# Patient Record
Sex: FEMALE | Race: BLACK OR AFRICAN AMERICAN | NOT HISPANIC OR LATINO
[De-identification: names, ages, dates, MRNs, and addresses within clinical notes are randomized per-mention and may not be internally consistent; named-entity substitution may affect disease eponyms.]

---

## 2017-08-25 ENCOUNTER — RESULT REVIEW (OUTPATIENT)
Age: 27
End: 2017-08-25

## 2018-03-20 ENCOUNTER — RESULT REVIEW (OUTPATIENT)
Age: 28
End: 2018-03-20

## 2018-10-23 ENCOUNTER — RESULT REVIEW (OUTPATIENT)
Age: 28
End: 2018-10-23

## 2018-12-06 ENCOUNTER — RESULT REVIEW (OUTPATIENT)
Age: 28
End: 2018-12-06

## 2019-05-02 ENCOUNTER — RESULT REVIEW (OUTPATIENT)
Age: 29
End: 2019-05-02

## 2019-08-23 ENCOUNTER — APPOINTMENT (OUTPATIENT)
Dept: GYNECOLOGIC ONCOLOGY | Facility: CLINIC | Age: 29
End: 2019-08-23
Payer: COMMERCIAL

## 2019-08-23 ENCOUNTER — RECORD ABSTRACTING (OUTPATIENT)
Age: 29
End: 2019-08-23

## 2019-08-23 VITALS
BODY MASS INDEX: 23.74 KG/M2 | HEART RATE: 74 BPM | HEIGHT: 62 IN | SYSTOLIC BLOOD PRESSURE: 100 MMHG | WEIGHT: 129 LBS | DIASTOLIC BLOOD PRESSURE: 72 MMHG

## 2019-08-23 DIAGNOSIS — Z78.9 OTHER SPECIFIED HEALTH STATUS: ICD-10-CM

## 2019-08-23 DIAGNOSIS — Z80.3 FAMILY HISTORY OF MALIGNANT NEOPLASM OF BREAST: ICD-10-CM

## 2019-08-23 DIAGNOSIS — Z34.90 ENCOUNTER FOR SUPERVISION OF NORMAL PREGNANCY, UNSPECIFIED, UNSPECIFIED TRIMESTER: ICD-10-CM

## 2019-08-23 PROCEDURE — 57452 EXAM OF CERVIX W/SCOPE: CPT

## 2019-08-23 PROCEDURE — 99203 OFFICE O/P NEW LOW 30 MIN: CPT | Mod: 25

## 2019-09-03 ENCOUNTER — APPOINTMENT (OUTPATIENT)
Dept: ANTEPARTUM | Facility: CLINIC | Age: 29
End: 2019-09-03
Payer: COMMERCIAL

## 2019-09-03 ENCOUNTER — ASOB RESULT (OUTPATIENT)
Age: 29
End: 2019-09-03

## 2019-09-03 PROCEDURE — 76801 OB US < 14 WKS SINGLE FETUS: CPT

## 2019-09-03 PROCEDURE — 36416 COLLJ CAPILLARY BLOOD SPEC: CPT

## 2019-09-03 PROCEDURE — 76813 OB US NUCHAL MEAS 1 GEST: CPT

## 2019-10-29 ENCOUNTER — APPOINTMENT (OUTPATIENT)
Dept: ANTEPARTUM | Facility: CLINIC | Age: 29
End: 2019-10-29
Payer: COMMERCIAL

## 2019-10-29 ENCOUNTER — ASOB RESULT (OUTPATIENT)
Age: 29
End: 2019-10-29

## 2019-10-29 PROCEDURE — 76811 OB US DETAILED SNGL FETUS: CPT

## 2019-12-17 ENCOUNTER — INPATIENT (INPATIENT)
Facility: HOSPITAL | Age: 29
LOS: 1 days | Discharge: HOME | End: 2019-12-19
Attending: SPECIALIST | Admitting: SPECIALIST
Payer: COMMERCIAL

## 2019-12-17 VITALS
HEART RATE: 102 BPM | RESPIRATION RATE: 17 BRPM | HEIGHT: 62 IN | SYSTOLIC BLOOD PRESSURE: 121 MMHG | TEMPERATURE: 98 F | WEIGHT: 151.9 LBS | DIASTOLIC BLOOD PRESSURE: 69 MMHG

## 2019-12-17 DIAGNOSIS — Z98.890 OTHER SPECIFIED POSTPROCEDURAL STATES: Chronic | ICD-10-CM

## 2019-12-17 DIAGNOSIS — O47.9 FALSE LABOR, UNSPECIFIED: ICD-10-CM

## 2019-12-17 DIAGNOSIS — O26.899 OTHER SPECIFIED PREGNANCY RELATED CONDITIONS, UNSPECIFIED TRIMESTER: ICD-10-CM

## 2019-12-17 DIAGNOSIS — Z3A.00 WEEKS OF GESTATION OF PREGNANCY NOT SPECIFIED: ICD-10-CM

## 2019-12-17 LAB
ALBUMIN SERPL ELPH-MCNC: 3.4 G/DL — SIGNIFICANT CHANGE UP (ref 3.3–5)
ALP SERPL-CCNC: 62 U/L — SIGNIFICANT CHANGE UP (ref 40–120)
ALT FLD-CCNC: 36 U/L — HIGH (ref 4–33)
ANION GAP SERPL CALC-SCNC: 13 MMO/L — SIGNIFICANT CHANGE UP (ref 7–14)
APPEARANCE UR: CLEAR — SIGNIFICANT CHANGE UP
APTT BLD: 25.6 SEC — LOW (ref 27.5–36.3)
AST SERPL-CCNC: 29 U/L — SIGNIFICANT CHANGE UP (ref 4–32)
BACTERIA # UR AUTO: HIGH
BASOPHILS # BLD AUTO: 0.1 K/UL — SIGNIFICANT CHANGE UP (ref 0–0.2)
BASOPHILS NFR BLD AUTO: 0.7 % — SIGNIFICANT CHANGE UP (ref 0–2)
BILIRUB SERPL-MCNC: < 0.2 MG/DL — LOW (ref 0.2–1.2)
BILIRUB UR-MCNC: NEGATIVE — SIGNIFICANT CHANGE UP
BLOOD UR QL VISUAL: NEGATIVE — SIGNIFICANT CHANGE UP
BUN SERPL-MCNC: 12 MG/DL — SIGNIFICANT CHANGE UP (ref 7–23)
CALCIUM SERPL-MCNC: 9 MG/DL — SIGNIFICANT CHANGE UP (ref 8.4–10.5)
CHLORIDE SERPL-SCNC: 103 MMOL/L — SIGNIFICANT CHANGE UP (ref 98–107)
CO2 SERPL-SCNC: 21 MMOL/L — LOW (ref 22–31)
COLOR SPEC: SIGNIFICANT CHANGE UP
CREAT SERPL-MCNC: 0.51 MG/DL — SIGNIFICANT CHANGE UP (ref 0.5–1.3)
EOSINOPHIL # BLD AUTO: 0.16 K/UL — SIGNIFICANT CHANGE UP (ref 0–0.5)
EOSINOPHIL NFR BLD AUTO: 1.1 % — SIGNIFICANT CHANGE UP (ref 0–6)
FIBRINOGEN PPP-MCNC: 520 MG/DL — HIGH (ref 350–510)
FLECAINIDE SERPL-MCNC: NEGATIVE — SIGNIFICANT CHANGE UP
GLUCOSE BLDC GLUCOMTR-MCNC: 71 MG/DL — SIGNIFICANT CHANGE UP (ref 70–99)
GLUCOSE SERPL-MCNC: 89 MG/DL — SIGNIFICANT CHANGE UP (ref 70–99)
GLUCOSE UR-MCNC: 300 — HIGH
HCT VFR BLD CALC: 36.9 % — SIGNIFICANT CHANGE UP (ref 34.5–45)
HGB BLD-MCNC: 12 G/DL — SIGNIFICANT CHANGE UP (ref 11.5–15.5)
HYALINE CASTS # UR AUTO: NEGATIVE — SIGNIFICANT CHANGE UP
IMM GRANULOCYTES NFR BLD AUTO: 1.1 % — SIGNIFICANT CHANGE UP (ref 0–1.5)
INR BLD: 0.93 — SIGNIFICANT CHANGE UP (ref 0.88–1.17)
KETONES UR-MCNC: NEGATIVE — SIGNIFICANT CHANGE UP
LDH SERPL L TO P-CCNC: 243 U/L — HIGH (ref 135–225)
LEUKOCYTE ESTERASE UR-ACNC: SIGNIFICANT CHANGE UP
LYMPHOCYTES # BLD AUTO: 17 % — SIGNIFICANT CHANGE UP (ref 13–44)
LYMPHOCYTES # BLD AUTO: 2.53 K/UL — SIGNIFICANT CHANGE UP (ref 1–3.3)
MCHC RBC-ENTMCNC: 27.8 PG — SIGNIFICANT CHANGE UP (ref 27–34)
MCHC RBC-ENTMCNC: 32.5 % — SIGNIFICANT CHANGE UP (ref 32–36)
MCV RBC AUTO: 85.6 FL — SIGNIFICANT CHANGE UP (ref 80–100)
MONOCYTES # BLD AUTO: 1.29 K/UL — HIGH (ref 0–0.9)
MONOCYTES NFR BLD AUTO: 8.7 % — SIGNIFICANT CHANGE UP (ref 2–14)
NEUTROPHILS # BLD AUTO: 10.62 K/UL — HIGH (ref 1.8–7.4)
NEUTROPHILS NFR BLD AUTO: 71.4 % — SIGNIFICANT CHANGE UP (ref 43–77)
NITRITE UR-MCNC: NEGATIVE — SIGNIFICANT CHANGE UP
NRBC # FLD: 0 K/UL — SIGNIFICANT CHANGE UP (ref 0–0)
PH UR: 6.5 — SIGNIFICANT CHANGE UP (ref 5–8)
PLATELET # BLD AUTO: 273 K/UL — SIGNIFICANT CHANGE UP (ref 150–400)
PMV BLD: 9.7 FL — SIGNIFICANT CHANGE UP (ref 7–13)
POTASSIUM SERPL-MCNC: 3.7 MMOL/L — SIGNIFICANT CHANGE UP (ref 3.5–5.3)
POTASSIUM SERPL-SCNC: 3.7 MMOL/L — SIGNIFICANT CHANGE UP (ref 3.5–5.3)
PROT SERPL-MCNC: 6.8 G/DL — SIGNIFICANT CHANGE UP (ref 6–8.3)
PROT UR-MCNC: NEGATIVE — SIGNIFICANT CHANGE UP
PROTHROM AB SERPL-ACNC: 10.6 SEC — SIGNIFICANT CHANGE UP (ref 9.8–13.1)
RBC # BLD: 4.31 M/UL — SIGNIFICANT CHANGE UP (ref 3.8–5.2)
RBC # FLD: 13.2 % — SIGNIFICANT CHANGE UP (ref 10.3–14.5)
RBC CASTS # UR COMP ASSIST: SIGNIFICANT CHANGE UP (ref 0–?)
SODIUM SERPL-SCNC: 137 MMOL/L — SIGNIFICANT CHANGE UP (ref 135–145)
SP GR SPEC: 1.01 — SIGNIFICANT CHANGE UP (ref 1–1.04)
SQUAMOUS # UR AUTO: SIGNIFICANT CHANGE UP
URATE SERPL-MCNC: 2.5 MG/DL — SIGNIFICANT CHANGE UP (ref 2.5–7)
UROBILINOGEN FLD QL: NORMAL — SIGNIFICANT CHANGE UP
WBC # BLD: 14.86 K/UL — HIGH (ref 3.8–10.5)
WBC # FLD AUTO: 14.86 K/UL — HIGH (ref 3.8–10.5)
WBC UR QL: HIGH (ref 0–?)

## 2019-12-17 PROCEDURE — 76700 US EXAM ABDOM COMPLETE: CPT | Mod: 26

## 2019-12-17 RX ORDER — AMPICILLIN TRIHYDRATE 250 MG
2 CAPSULE ORAL ONCE
Refills: 0 | Status: COMPLETED | OUTPATIENT
Start: 2019-12-17 | End: 2019-12-17

## 2019-12-17 RX ORDER — MAGNESIUM SULFATE 500 MG/ML
4 VIAL (ML) INJECTION ONCE
Refills: 0 | Status: COMPLETED | OUTPATIENT
Start: 2019-12-17 | End: 2019-12-18

## 2019-12-17 RX ORDER — MAGNESIUM SULFATE 500 MG/ML
2 VIAL (ML) INJECTION
Qty: 40 | Refills: 0 | Status: DISCONTINUED | OUTPATIENT
Start: 2019-12-17 | End: 2019-12-18

## 2019-12-17 RX ORDER — SODIUM CHLORIDE 9 MG/ML
1000 INJECTION, SOLUTION INTRAVENOUS
Refills: 0 | Status: DISCONTINUED | OUTPATIENT
Start: 2019-12-17 | End: 2019-12-18

## 2019-12-17 RX ORDER — SODIUM CHLORIDE 9 MG/ML
1000 INJECTION, SOLUTION INTRAVENOUS ONCE
Refills: 0 | Status: COMPLETED | OUTPATIENT
Start: 2019-12-17 | End: 2019-12-17

## 2019-12-17 RX ORDER — AMPICILLIN TRIHYDRATE 250 MG
1 CAPSULE ORAL EVERY 4 HOURS
Refills: 0 | Status: DISCONTINUED | OUTPATIENT
Start: 2019-12-17 | End: 2019-12-18

## 2019-12-17 RX ADMIN — SODIUM CHLORIDE 1000 MILLILITER(S): 9 INJECTION, SOLUTION INTRAVENOUS at 23:15

## 2019-12-17 RX ADMIN — Medication 12 MILLIGRAM(S): at 21:40

## 2019-12-17 RX ADMIN — SODIUM CHLORIDE 1000 MILLILITER(S): 9 INJECTION, SOLUTION INTRAVENOUS at 21:20

## 2019-12-17 NOTE — OB PROVIDER TRIAGE NOTE - NSHPLABSRESULTS_GEN_ALL_CORE
Vital Signs Last 24 Hrs  T(C): 36.8 (17 Dec 2019 19:10), Max: 36.8 (17 Dec 2019 19:10)  T(F): 98.2 (17 Dec 2019 19:10), Max: 98.2 (17 Dec 2019 19:10)  HR: 93 (17 Dec 2019 21:30) (80 - 102)  BP: 100/58 (17 Dec 2019 20:36) (100/58 - 121/69)  BP(mean): --  RR: 17 (17 Dec 2019 19:10) (17 - 17)  SpO2: 100% (17 Dec 2019 21:30) (78% - 100%) Vital Signs Last 24 Hrs  T(C): 36.8 (17 Dec 2019 19:10), Max: 36.8 (17 Dec 2019 19:10)  T(F): 98.2 (17 Dec 2019 19:10), Max: 98.2 (17 Dec 2019 19:10)  HR: 93 (17 Dec 2019 21:30) (80 - 102)  BP: 100/58 (17 Dec 2019 20:36) (100/58 - 121/69)  BP(mean): --  RR: 17 (17 Dec 2019 19:10) (17 - 17)  SpO2: 100% (17 Dec 2019 21:30) (78% - 100%)                          12.0   14.86 )-----------( 273      ( 17 Dec 2019 21:25 )             36.9     12    137  |  103  |  12  ----------------------------<  89  3.7   |  21<L>  |  0.51    Ca    9.0      17 Dec 2019 21:25    TPro  6.8  /  Alb  3.4  /  TBili  < 0.2<L>  /  DBili  x   /  AST  29  /  ALT  36<H>  /  AlkPhos  62  12-17    PT/INR - ( 17 Dec 2019 22:10 )   PT: 10.6 SEC;   INR: 0.93          PTT - ( 17 Dec 2019 22:10 )  PTT:25.6 SEC    Fibrinogen: 520    Urinalysis Basic - ( 17 Dec 2019 20:30 )    Color: LIGHT YELLOW / Appearance: CLEAR / S.011 / pH: 6.5  Gluc: 300 / Ketone: NEGATIVE  / Bili: NEGATIVE / Urobili: NORMAL   Blood: NEGATIVE / Protein: NEGATIVE / Nitrite: NEGATIVE   Leuk Esterase: SMALL / RBC: 0-2 / WBC 6-10   Sq Epi: FEW / Non Sq Epi: x / Bacteria: MODERATE

## 2019-12-17 NOTE — OB PROVIDER TRIAGE NOTE - HISTORY OF PRESENT ILLNESS
Patient of Dr Sandoval  30 y/o AA female, para 0000 @ 27+1 wks gestation, SIUP.  Presents with intermittent abdominal cramping, pelvic pressure, and back pain since 6am. Pt denies any urinary symptoms, denies any fever, chills.  pt endorses strong fetal movement, no leakage of fluid, no vaginal bleeding. Pt reports uncomplicated A/P course thus far.    Allergies: NKDA  Meds: Prenatal vitamins  OBgynHx    2018-Abn. PAP, s/p benign colposcopy  Pt denies any h/o: ovarian cysts, fibroids, breast problems, STDs/STIs  PMH: denies  PSH: Colposcopy with cervical biopsy ('18)  PSY: Denies  EtOH/smoke/recreational drugs: denies  FH: Breast CA (Mother Dx @ 49 y/o)  H/W/BMI: 62"/152#/27.8

## 2019-12-17 NOTE — OB RN TRIAGE NOTE - PMH
Abnormal cervical Papanicolaou smear, unspecified abnormal pap finding  Diagnosed at 8 weeks pregnancy, for PP follow up

## 2019-12-17 NOTE — OB PROVIDER H&P - HISTORY OF PRESENT ILLNESS
Patient of Dr Sandoval  28 y/o AA female, para 0000 @ 27+1 wks gestation, SIUP.  Presents with intermittent abdominal cramping, pelvic pressure, and back pain since 6am. Pt denies any urinary symptoms, denies any fever, chills.  pt endorses strong fetal movement, no leakage of fluid, no vaginal bleeding. Pt reports uncomplicated A/P course thus far.    Allergies: NKDA  Meds: Prenatal vitamins  OBgynHx    2018-Abn. PAP, s/p benign colposcopy  Pt denies any h/o: ovarian cysts, fibroids, breast problems, STDs/STIs  PMH: Chronic elevated liver enzymes  PSH: Colposcopy with cervical biopsy ('18)  PSY: Denies  EtOH/smoke/recreational drugs: denies  FH: Breast CA (Mother Dx @ 49 y/o)  H/W/BMI: 62"/152#/27.8

## 2019-12-17 NOTE — OB PROVIDER TRIAGE NOTE - NS_OBGYNHISTORY_OBGYN_ALL_OB_FT
2018-Abn. PAP, s/p benign colposcopy  Pt denies any h/o: ovarian cysts, fibroids, breast problems, STDs/STIs

## 2019-12-17 NOTE — OB PROVIDER H&P - NSHPPHYSICALEXAM_GEN_ALL_CORE
28 y/o AA female, para 0000 @ 27+1 wks gestation, SIUP.  PT contractions with Incidental finding of short cervix  Gen: NAD; A&O x3  Abd. gravid, soft in b/t ctx, non-tender, mild ctx palpated  Negative CVA tenderness  Negative suprapubic tenderness  VS--BP: 121/69, P102, RR 18, T 36.8, pain 3/10  FS: 71  Cat 1 tracin bpm, moderate variability, +accels, no decels  [place]: q 2 min.  SSE: +Leukorrhea, no blood, no pooling, cervix appears opened  SVE: /-3, intact membranes  GBS obtained and sent  TVUS: Cervical length 1.39-1.59 cm, marked funneling, no previa, no dynamic changes-FFN negative  Limited TAS: SLIUP, Cephalic, posterior placenta, BPP 8/8, ALEXA 25.14 cm, EFW [id number]  Pt states GCT not done yet, scheduled for

## 2019-12-17 NOTE — OB PROVIDER TRIAGE NOTE - NSHPPHYSICALEXAM_GEN_ALL_CORE
28 y/o AA female, para 0000 @ 27+1 wks gestation, SIUP.  Gen: NAD; A&O x3  Abd. gravid, soft in b/t ctx, non-tender, mild ctx palpated  VS--BP: 121/69, P102, RR 18, T 36.8, pain 3/10  Cat 1 tracin bpm, moderate variability, +accels, no decels  [place]: q 2 min.  SSE: +Leukorrhea, no blood, no pooling, cervix appears opened  SVE: /-3, intact membranes  GBS obtained and sent  TVUS: Cervical length 1.39-1.59 cm, marked funneling, no previa, no dynamic changes-FFN sent  Limited TAS: SLIUP, Cephalic, posterior placenta, BPP 8/8, ALEXA 25.14 cm, EFW AGA-1209g  Plan:  CBC/CMP/UA/C&S/urine GC/FFN/IVF/Betamethasone 28 y/o AA female, para 0000 @ 27+1 wks gestation, SIUP.  Gen: NAD; A&O x3  Abd. gravid, soft in b/t ctx, non-tender, mild ctx palpated  VS--BP: 121/69, P102, RR 18, T 36.8, pain 3/10  FS: 71  Cat 1 tracin bpm, moderate variability, +accels, no decels  [place]: q 2 min.  SSE: +Leukorrhea, no blood, no pooling, cervix appears opened  SVE: /-3, intact membranes  GBS obtained and sent  TVUS: Cervical length 1.39-1.59 cm, marked funneling, no previa, no dynamic changes-FFN sent  Limited TAS: SLIUP, Cephalic, posterior placenta, BPP 8/8, ALEXA 25.14 cm, EFW [id number]-1209g  Plan:  CBC/CMP/UA/C&S/urine GC/FFN/IVF/Betamethasone 28 y/o AA female, para 0000 @ 27+1 wks gestation, SIUP.  Gen: NAD; A&O x3  Abd. gravid, soft in b/t ctx, non-tender, mild ctx palpated  VS--BP: 121/69, P102, RR 18, T 36.8, pain 3/10  FS: 71  Cat 1 tracin bpm, moderate variability, +accels, no decels  [place]: q 2 min.  SSE: +Leukorrhea, no blood, no pooling, cervix appears opened  SVE: /-3, intact membranes  GBS obtained and sent  TVUS: Cervical length 1.39-1.59 cm, marked funneling, no previa, no dynamic changes-FFN sent  Limited TAS: SLIUP, Cephalic, posterior placenta, BPP 8/8, ALEXA 25.14 cm, EFW AGA-1209g  Pt states GCT not done yet, scheduled for   Plan:  CBC/CMP/UA/C&S/urine GC/FFN/IVF/Betamethasone 30 y/o AA female, para 0000 @ 27+1 wks gestation, SIUP.  Gen: NAD; A&O x3  Abd. gravid, soft in b/t ctx, non-tender, mild ctx palpated  Negative CVA tenderness  Negative suprapubic tenderness  VS--BP: 121/69, P102, RR 18, T 36.8, pain 3/10  FS: 71  Cat 1 tracin bpm, moderate variability, +accels, no decels  [place]: q 2 min.  SSE: +Leukorrhea, no blood, no pooling, cervix appears opened  SVE: 50/-3, intact membranes  GBS obtained and sent  TVUS: Cervical length 1.39-1.59 cm, marked funneling, no previa, no dynamic changes-FFN sent  Limited TAS: SLIUP, Cephalic, posterior placenta, BPP 8/8, ALEXA 25.14 cm, EFW [id number]-1209g  Pt states GCT not done yet, scheduled for   Plan:  CBC/CMP/UA/C&S/urine GC/FFN/IVF/Betamethasone

## 2019-12-17 NOTE — OB PROVIDER H&P - NSHPLABSRESULTS_GEN_ALL_CORE
Vital Signs Last 24 Hrs  T(C): 36.8 (17 Dec 2019 19:10), Max: 36.8 (17 Dec 2019 19:10)  T(F): 98.2 (17 Dec 2019 19:10), Max: 98.2 (17 Dec 2019 19:10)  HR: 93 (17 Dec 2019 21:30) (80 - 102)  BP: 100/58 (17 Dec 2019 20:36) (100/58 - 121/69)  BP(mean): --  RR: 17 (17 Dec 2019 19:10) (17 - 17)  SpO2: 100% (17 Dec 2019 21:30) (78% - 100%)                          12.0   14.86 )-----------( 273      ( 17 Dec 2019 21:25 )             36.9     12    137  |  103  |  12  ----------------------------<  89  3.7   |  21<L>  |  0.51    Ca    9.0      17 Dec 2019 21:25    TPro  6.8  /  Alb  3.4  /  TBili  < 0.2<L>  /  DBili  x   /  AST  29  /  ALT  36<H>  /  AlkPhos  62  12-17    PT/INR - ( 17 Dec 2019 22:10 )   PT: 10.6 SEC;   INR: 0.93          PTT - ( 17 Dec 2019 22:10 )  PTT:25.6 SEC    Fibrinogen: 520    Urinalysis Basic - ( 17 Dec 2019 20:30 )    Color: LIGHT YELLOW / Appearance: CLEAR / S.011 / pH: 6.5  Gluc: 300 / Ketone: NEGATIVE  / Bili: NEGATIVE / Urobili: NORMAL   Blood: NEGATIVE / Protein: NEGATIVE / Nitrite: NEGATIVE   Leuk Esterase: SMALL / RBC: 0-2 / WBC 6-10   Sq Epi: FEW / Non Sq Epi: x / Bacteria: MODERATE

## 2019-12-17 NOTE — OB PROVIDER H&P - ASSESSMENT
HPI:  Patient of Dr Sandoval  30 y/o AA female, para 0000 @ 27+1 wks gestation, SIUP.  Presents with intermittent abdominal cramping, pelvic pressure, and back pain since 6am. Pt denies any urinary symptoms, denies any fever, chills.  pt endorses strong fetal movement, no leakage of fluid, no vaginal bleeding. Pt reports uncomplicated A/P course thus far.  Assessment:  30 y/o AA female, para 0000 @ 27+1 wks gestation, SIUP.  PT contractions with Incidental finding of short cervix  Gen: NAD; A&O x3  Abd. gravid, soft in b/t ctx, non-tender, mild ctx palpated  Negative CVA tenderness  Negative suprapubic tenderness  VS--BP: 121/69, P102, RR 18, T 36.8, pain 3/10  FS: 71  Cat 1 tracin bpm, moderate variability, +accels, no decels  [place]: q 2 min.  SSE: +Leukorrhea, no blood, no pooling, cervix appears opened  SVE: 50/-3, intact membranes  TVUS: Cervical length 1.39-1.59 cm, marked funneling, no previa, no dynamic changes - FFN negative  Limited TAS: SLIUP, Cephalic, posterior placenta, BPP 8/8, ALEXA 25.14 cm, EFW AGA-1209g  Pt states GCT not done yet, scheduled for   ALT elevated-Pt reports prior history of elevated liver enzymes and per PMD "not concerned".  GBS obtained and sent  Urine culture sent  Urine GC sent  First dose of Betamethasone @   Plan of care d/w Dr Sam    Plan:  Admit to L&D  -For Magnesium sulfate/Ampicillin/Betamethasone  -2nd dose of Betamethasone due  @   -Re-assess pain management options PRN  -Abdominal U/S report-pending  -PEC labs-Pending  Report off to Dr Pineda

## 2019-12-17 NOTE — OB PROVIDER TRIAGE NOTE - FAMILY HISTORY
Mother  Still living? Unknown  Family history of breast cancer in first degree relative, Age at diagnosis: Age Unknown

## 2019-12-17 NOTE — OB PROVIDER H&P - PROBLEM SELECTOR PLAN 1
Admit to L&D  -For Magnesium sulfate/Ampicillin/Betamethasone  -2nd dose of Betamethasone due 12/18 @ 0590  -Re-assess pain management options PRN  -Abdominal U/S report-pending  -PEC labs-Pending

## 2019-12-17 NOTE — OB PROVIDER TRIAGE NOTE - LABOR: FETAL STATION
-3
Elbow fracture, right    Lumbar disc herniation    Retinal detachment  left eye- 05/08/2014  Triceps tendinitis  right

## 2019-12-17 NOTE — OB PROVIDER H&P - PMH
Abnormal cervical Papanicolaou smear, unspecified abnormal pap finding  Diagnosed at 8 weeks pregnancy, for PP follow up  Elevated liver enzymes  Chronic

## 2019-12-17 NOTE — OB PROVIDER TRIAGE NOTE - NSOBPROVIDERNOTE_OBGYN_ALL_OB_FT
ALT elevated-Pt reports prior history of elevated liver enzymes and per PMD "not concerned". ALT elevated-Pt reports prior history of elevated liver enzymes and per PMD "not concerned". Abdominal ultrasound ordered and performed-pending results.   PEC labs not fully resulted.   Dr Sam notified of above findings  -Admit to L&D per Dr Sam  See H&P

## 2019-12-17 NOTE — OB PROVIDER H&P - NS_PHYSICALALLNEG_OBGYN_ALL_OB
Pt EKG attached in chart. Pt states no NSAIDs, ASAs, or blood thinners taken in the past week. All other review of systems negative, except as noted in HPI

## 2019-12-18 ENCOUNTER — ASOB RESULT (OUTPATIENT)
Age: 29
End: 2019-12-18

## 2019-12-18 ENCOUNTER — OUTPATIENT (OUTPATIENT)
Dept: OUTPATIENT SERVICES | Facility: HOSPITAL | Age: 29
LOS: 1 days | End: 2019-12-18

## 2019-12-18 ENCOUNTER — APPOINTMENT (OUTPATIENT)
Dept: ANTEPARTUM | Facility: HOSPITAL | Age: 29
End: 2019-12-18
Payer: COMMERCIAL

## 2019-12-18 DIAGNOSIS — O60.00 PRETERM LABOR WITHOUT DELIVERY, UNSPECIFIED TRIMESTER: ICD-10-CM

## 2019-12-18 DIAGNOSIS — Z98.890 OTHER SPECIFIED POSTPROCEDURAL STATES: Chronic | ICD-10-CM

## 2019-12-18 LAB
ALBUMIN SERPL ELPH-MCNC: 3.5 G/DL — SIGNIFICANT CHANGE UP (ref 3.3–5)
ALP SERPL-CCNC: 65 U/L — SIGNIFICANT CHANGE UP (ref 40–120)
ALT FLD-CCNC: 35 U/L — HIGH (ref 4–33)
ANION GAP SERPL CALC-SCNC: 13 MMO/L — SIGNIFICANT CHANGE UP (ref 7–14)
APTT BLD: 24.9 SEC — LOW (ref 27.5–36.3)
AST SERPL-CCNC: 31 U/L — SIGNIFICANT CHANGE UP (ref 4–32)
BASOPHILS # BLD AUTO: 0.07 K/UL — SIGNIFICANT CHANGE UP (ref 0–0.2)
BASOPHILS NFR BLD AUTO: 0.4 % — SIGNIFICANT CHANGE UP (ref 0–2)
BILIRUB SERPL-MCNC: < 0.2 MG/DL — LOW (ref 0.2–1.2)
BLD GP AB SCN SERPL QL: NEGATIVE — SIGNIFICANT CHANGE UP
BUN SERPL-MCNC: 7 MG/DL — SIGNIFICANT CHANGE UP (ref 7–23)
CALCIUM SERPL-MCNC: 7.9 MG/DL — LOW (ref 8.4–10.5)
CHLORIDE SERPL-SCNC: 102 MMOL/L — SIGNIFICANT CHANGE UP (ref 98–107)
CO2 SERPL-SCNC: 20 MMOL/L — LOW (ref 22–31)
CREAT ?TM UR-MCNC: 20.4 MG/DL — SIGNIFICANT CHANGE UP
CREAT SERPL-MCNC: 0.51 MG/DL — SIGNIFICANT CHANGE UP (ref 0.5–1.3)
EOSINOPHIL # BLD AUTO: 0 K/UL — SIGNIFICANT CHANGE UP (ref 0–0.5)
EOSINOPHIL NFR BLD AUTO: 0 % — SIGNIFICANT CHANGE UP (ref 0–6)
FIBRINOGEN PPP-MCNC: 551 MG/DL — HIGH (ref 350–510)
GLUCOSE SERPL-MCNC: 137 MG/DL — HIGH (ref 70–99)
HCT VFR BLD CALC: 37.4 % — SIGNIFICANT CHANGE UP (ref 34.5–45)
HGB BLD-MCNC: 12 G/DL — SIGNIFICANT CHANGE UP (ref 11.5–15.5)
IMM GRANULOCYTES NFR BLD AUTO: 1.4 % — SIGNIFICANT CHANGE UP (ref 0–1.5)
INR BLD: 0.94 — SIGNIFICANT CHANGE UP (ref 0.88–1.17)
LDH SERPL L TO P-CCNC: 234 U/L — HIGH (ref 135–225)
LYMPHOCYTES # BLD AUTO: 1.21 K/UL — SIGNIFICANT CHANGE UP (ref 1–3.3)
LYMPHOCYTES # BLD AUTO: 6.9 % — LOW (ref 13–44)
MAGNESIUM SERPL-MCNC: 4.6 MG/DL — HIGH (ref 1.6–2.6)
MAGNESIUM SERPL-MCNC: 5.2 MG/DL — HIGH (ref 1.6–2.6)
MCHC RBC-ENTMCNC: 27.1 PG — SIGNIFICANT CHANGE UP (ref 27–34)
MCHC RBC-ENTMCNC: 32.1 % — SIGNIFICANT CHANGE UP (ref 32–36)
MCV RBC AUTO: 84.4 FL — SIGNIFICANT CHANGE UP (ref 80–100)
MONOCYTES # BLD AUTO: 0.12 K/UL — SIGNIFICANT CHANGE UP (ref 0–0.9)
MONOCYTES NFR BLD AUTO: 0.7 % — LOW (ref 2–14)
NEUTROPHILS # BLD AUTO: 15.99 K/UL — HIGH (ref 1.8–7.4)
NEUTROPHILS NFR BLD AUTO: 90.6 % — HIGH (ref 43–77)
NRBC # FLD: 0 K/UL — SIGNIFICANT CHANGE UP (ref 0–0)
PLATELET # BLD AUTO: 285 K/UL — SIGNIFICANT CHANGE UP (ref 150–400)
PMV BLD: 9.8 FL — SIGNIFICANT CHANGE UP (ref 7–13)
POTASSIUM SERPL-MCNC: 4 MMOL/L — SIGNIFICANT CHANGE UP (ref 3.5–5.3)
POTASSIUM SERPL-SCNC: 4 MMOL/L — SIGNIFICANT CHANGE UP (ref 3.5–5.3)
PROT SERPL-MCNC: 7 G/DL — SIGNIFICANT CHANGE UP (ref 6–8.3)
PROT UR-MCNC: 4.4 MG/DL — SIGNIFICANT CHANGE UP
PROTHROM AB SERPL-ACNC: 10.4 SEC — SIGNIFICANT CHANGE UP (ref 9.8–13.1)
RBC # BLD: 4.43 M/UL — SIGNIFICANT CHANGE UP (ref 3.8–5.2)
RBC # FLD: 13.2 % — SIGNIFICANT CHANGE UP (ref 10.3–14.5)
RH IG SCN BLD-IMP: POSITIVE — SIGNIFICANT CHANGE UP
RH IG SCN BLD-IMP: POSITIVE — SIGNIFICANT CHANGE UP
SODIUM SERPL-SCNC: 135 MMOL/L — SIGNIFICANT CHANGE UP (ref 135–145)
T PALLIDUM AB TITR SER: NEGATIVE — SIGNIFICANT CHANGE UP
URATE SERPL-MCNC: 2.5 MG/DL — SIGNIFICANT CHANGE UP (ref 2.5–7)
WBC # BLD: 17.64 K/UL — HIGH (ref 3.8–10.5)
WBC # FLD AUTO: 17.64 K/UL — HIGH (ref 3.8–10.5)

## 2019-12-18 PROCEDURE — 76819 FETAL BIOPHYS PROFIL W/O NST: CPT | Mod: 26

## 2019-12-18 PROCEDURE — 76816 OB US FOLLOW-UP PER FETUS: CPT | Mod: 26

## 2019-12-18 RX ORDER — INDOMETHACIN 50 MG
25 CAPSULE ORAL EVERY 6 HOURS
Refills: 0 | Status: DISCONTINUED | OUTPATIENT
Start: 2019-12-18 | End: 2019-12-19

## 2019-12-18 RX ORDER — INDOMETHACIN 50 MG
50 CAPSULE ORAL ONCE
Refills: 0 | Status: COMPLETED | OUTPATIENT
Start: 2019-12-18 | End: 2019-12-18

## 2019-12-18 RX ORDER — INDOMETHACIN 50 MG
25 CAPSULE ORAL EVERY 6 HOURS
Refills: 0 | Status: DISCONTINUED | OUTPATIENT
Start: 2019-12-18 | End: 2019-12-18

## 2019-12-18 RX ADMIN — Medication 50 MILLIGRAM(S): at 05:30

## 2019-12-18 RX ADMIN — Medication 25 MILLIGRAM(S): at 17:12

## 2019-12-18 RX ADMIN — Medication 50 GM/HR: at 07:04

## 2019-12-18 RX ADMIN — Medication 25 MILLIGRAM(S): at 10:16

## 2019-12-18 RX ADMIN — Medication 200 GRAM(S): at 00:48

## 2019-12-18 RX ADMIN — Medication 216 GRAM(S): at 00:06

## 2019-12-18 RX ADMIN — Medication 50 MILLIGRAM(S): at 04:27

## 2019-12-18 RX ADMIN — Medication 108 GRAM(S): at 12:11

## 2019-12-18 RX ADMIN — Medication 25 MILLIGRAM(S): at 16:28

## 2019-12-18 RX ADMIN — Medication 108 GRAM(S): at 16:03

## 2019-12-18 RX ADMIN — Medication 12 MILLIGRAM(S): at 21:40

## 2019-12-18 RX ADMIN — Medication 50 GM/HR: at 01:10

## 2019-12-18 RX ADMIN — SODIUM CHLORIDE 50 MILLILITER(S): 9 INJECTION, SOLUTION INTRAVENOUS at 02:29

## 2019-12-18 RX ADMIN — Medication 25 MILLIGRAM(S): at 22:23

## 2019-12-18 RX ADMIN — Medication 108 GRAM(S): at 04:00

## 2019-12-18 RX ADMIN — Medication 25 MILLIGRAM(S): at 10:45

## 2019-12-18 RX ADMIN — Medication 108 GRAM(S): at 08:02

## 2019-12-18 NOTE — CHART NOTE - NSCHARTNOTEFT_GEN_A_CORE
Patient seen to evaluate for cervical change. Patient noted to be abhay every every 3 min on toco. Patient reports feeling contractions that "vary" from every 3min to every 10. Denies VB, LOF.       SVE: 3/80/-3, membranes intact  EFM: 140s, mod layne, + acels, no decels  [place]: q3 min    assessment: 30yo  @ 27w1d a/w r/o PTL, with regular contraction now making cervical change  plan:   - s/p BMX @ 940p  - continue Mag, amp  - start idomethacin 50mg once, then 25 q6h  - NICU called   - will continue monitoring on L&D  - repeat CBC/CMP with 7am mag level due to elevated LFTs    d/w Dr. Flaco Sosa PGY-4

## 2019-12-18 NOTE — CHART NOTE - NSCHARTNOTEFT_GEN_A_CORE
27.2 wks with PTL  pt is abhay less on Indocin and Magnesium 2gm   ampicillin and second dose of steroids tonight at 9:40 pm   pt was seen by MFM  and NICU   and continue current plan  Cat 1 tracing  VTX

## 2019-12-18 NOTE — CONSULT NOTE PEDS - SUBJECTIVE AND OBJECTIVE BOX
I met with Ms. Mcnulty on the antepartum unit and discussed what to expect should she were to deliver at 27 weeks gestation.    1. The NICU team is going to be present at the time of delivery, and the infant will be placed under the warmer and immediately evaluated.    2. Due to severe prematurity the infant will need respiratory support, either in the form of CPAP or intubation with mechanical ventilation. If the infant requires intubation, surfactant will be administered immediately at delivery or soon after. Due to prematurity, the infant may develop lung disease during the course of the NICU admission, referred to as bronchopulmonary dysplasia.    3. Initially, feedings will not be started in the infant due to the severe prematurity. The infant will require placement of an orogastric tube to provide enteral feedings, and feeding volume will be advanced slowly as tolerated. If the infant shows signs and symptoms of feeding intolerance, feedings may be held, and the infant may require evaluation for necrotizing enterocolitis.    4. The importance of human milk as the exclusive source of nutrition for  infants was discussed.    5. In order to have IV access lines would be placed in the umbilicus and later long term lines such as PICC lines would be needed to receive IV fluids/nutrition and medications.    6. Due to the severe prematurity, the infant will be started on antibiotics and screened for infections once born. The infant may require other courses of antibiotics during their hospital course if an infection is suspected.    7. The infant will be monitored for hypotension and may require vasopressor medication. The infant may also require screening for a patent ductus arteriosus, and if clinically significant, may require medical or surgical treatment.    8. The infant is at risk for jaundice and will likely require phototherapy.    9. The infant will develop anemia of prematurity, and will likely require blood transfusions.    10. The infant is at increased risk for intraventricular hemorrhage, which may result in severe developmental delays. Even in the absence of IVH the infant is at risk for developmental delays as a consequence of prematurity. The infant will be followed by a developmental pediatrician after discharge from the NICU to monitor for neurodevelopmental delays.    11. The infant is at risk for retinopathy of prematurity. Severe ROP can lead to diminished visual acuity or blindness. The infant will be examined regularly by a pediatric ophthalmologist, and if ROP is severe may require medication or eye surgery.    Ms. Mcnulty had the chance to ask any questions she may have had, and was encouraged to contact the NICU at that time if additional questions arise.    Thank you for the opportunity to participate in the care of this patient and please inform us of any changes in her status.    Erin Aguilar   NICU Fellow  [id number]

## 2019-12-18 NOTE — PROGRESS NOTE ADULT - SUBJECTIVE AND OBJECTIVE BOX
R3 Antepartum Note-HD#    Patient seen and examined at bedside, no acute overnight events. No acute complaints. Pt reports +FM, denies LOF, VB, ctx, CP, SOB, N/V, fevers, and chills. Denies HA, blurry vision, RUQ/epigastric pain, new onset swelling.     Vital Signs Last 24 Hours  T(C): 36.6 (12-18-19 @ 03:30), Max: 36.9 (12-17-19 @ 23:40)  HR: 73 (12-18-19 @ 06:02) (69 - 102)  BP: 105/58 (12-18-19 @ 06:00) (87/52 - 121/69)  RR: 18 (12-17-19 @ 23:40) (17 - 18)  SpO2: 98% (12-18-19 @ 06:02) (78% - 100%)    CAPILLARY BLOOD GLUCOSE      POCT Blood Glucose.: 71 mg/dL (17 Dec 2019 21:34)      Physical Exam:  General: NAD  Abdomen: Soft, non-tender, gravid  Ext: No pain or swelling    Labs:             12.0   14.86 )-----------( 273      ( 12-17 @ 21:25 )             36.9     12-17 @ 21:25    137  |  103  |  12  ----------------------------<  89  3.7   |  21  |  0.51    Ca    9.0      12-17 @ 21:25    TPro  6.8  /  Alb  3.4  /  TBili  < 0.2  /  DBili  x   /  AST  29  /  ALT  36  /  AlkPhos  62  12-17 @ 21:25    PT/INR - ( 12-17 @ 22:10 )   PT: 10.6 SEC;   INR: 0.93     PTT - ( 12-17 @ 22:10 )  PTT:25.6 SEC    Uric Acid: (12-17 @ 22:10)  --       Fibrinogen: (12-17 @ 22:10)  520.0    LDH: (12-17 @ 22:10)  --         MEDICATIONS  (STANDING):  ampicillin  IVPB 1 Gram(s) IV Intermittent every 4 hours  betamethasone Injectable 12 milliGRAM(s) IntraMuscular every 24 hours  indomethacin 25 milliGRAM(s) Oral every 6 hours  lactated ringers. 1000 milliLiter(s) (50 mL/Hr) IV Continuous <Continuous>  magnesium sulfate Infusion 2 Gm/Hr (50 mL/Hr) IV Continuous <Continuous>    MEDICATIONS  (PRN): R3 Antepartum Note-HD#2    Patient seen and examined at bedside. No acute complaints. Patient no longer feels contractions; endorses mild intermittent back/cramping but no regular ctnxs as she previously felt. Pt reports +FM, denies LOF, VB, ctx, CP, SOB, N/V, fevers, and chills. Denies HA, blurry vision, RUQ/epigastric pain, new onset swelling.     Vital Signs Last 24 Hours  T(C): 36.6 (12-18-19 @ 03:30), Max: 36.9 (12-17-19 @ 23:40)  HR: 73 (12-18-19 @ 06:02) (69 - 102)  BP: 105/58 (12-18-19 @ 06:00) (87/52 - 121/69)  RR: 18 (12-17-19 @ 23:40) (17 - 18)  SpO2: 98% (12-18-19 @ 06:02) (78% - 100%)    CAPILLARY BLOOD GLUCOSE      POCT Blood Glucose.: 71 mg/dL (17 Dec 2019 21:34)      Physical Exam:  General: NAD  Abdomen: Soft, non-tender, gravid  Ext: No pain or swelling  [place]: ctxns q3m  EFM: cat 1    Labs:             12.0   14.86 )-----------( 273      ( 12-17 @ 21:25 )             36.9     12-17 @ 21:25    137  |  103  |  12  ----------------------------<  89  3.7   |  21  |  0.51    Ca    9.0      12-17 @ 21:25    TPro  6.8  /  Alb  3.4  /  TBili  < 0.2  /  DBili  x   /  AST  29  /  ALT  36  /  AlkPhos  62  12-17 @ 21:25    PT/INR - ( 12-17 @ 22:10 )   PT: 10.6 SEC;   INR: 0.93     PTT - ( 12-17 @ 22:10 )  PTT:25.6 SEC    Uric Acid: (12-17 @ 22:10)  --       Fibrinogen: (12-17 @ 22:10)  520.0    LDH: (12-17 @ 22:10)  --         MEDICATIONS  (STANDING):  ampicillin  IVPB 1 Gram(s) IV Intermittent every 4 hours  betamethasone Injectable 12 milliGRAM(s) IntraMuscular every 24 hours  indomethacin 25 milliGRAM(s) Oral every 6 hours  lactated ringers. 1000 milliLiter(s) (50 mL/Hr) IV Continuous <Continuous>  magnesium sulfate Infusion 2 Gm/Hr (50 mL/Hr) IV Continuous <Continuous>    MEDICATIONS  (PRN):

## 2019-12-18 NOTE — CHART NOTE - NSCHARTNOTEFT_GEN_A_CORE
R3 Note    Pt reexamined at bedside.  Pt states is no longer feeling contractions, feels cramping intermittently.    O:   T(C): 36.8 (19 @ 16:04), Max: 36.9 (19 @ 23:40)  HR: 95 (19 @ 16:05) (69 - 113)  BP: 102/61 (19 @ 15:18) (87/48 - 121/69)  RR: 18 (19 @ 23:40) (17 - 18)  SpO2: 98% (19 @ 16:05) (78% - 100%)    Gen: NAD  Abd: soft, non tender, gravid  SVE: 1-2/70/-3, posterior  FHT: 135bpm, mod layne, +accels 10x10, no decels  [place]: no cxts noted    A/P: 28yo  at 27w2d admitted overnight for  contractions.  On Mg, getting betamethasone for fetal lung maturity, amp, and indocin.  - stop magnesium and ampicillin  - will continue indocin until beta complete  - second dose of betamethasone tonight  - reassess tomorrow for potential discharge    D/w Dr. Sam, Dr. Moe Alonso PGY3

## 2019-12-19 ENCOUNTER — TRANSCRIPTION ENCOUNTER (OUTPATIENT)
Age: 29
End: 2019-12-19

## 2019-12-19 VITALS — TEMPERATURE: 98 F

## 2019-12-19 LAB
BACTERIA UR CULT: SIGNIFICANT CHANGE UP
C TRACH RRNA SPEC QL NAA+PROBE: SIGNIFICANT CHANGE UP
N GONORRHOEA RRNA SPEC QL NAA+PROBE: SIGNIFICANT CHANGE UP
SPECIMEN SOURCE: SIGNIFICANT CHANGE UP
SPECIMEN SOURCE: SIGNIFICANT CHANGE UP

## 2019-12-19 RX ADMIN — Medication 25 MILLIGRAM(S): at 10:10

## 2019-12-19 RX ADMIN — Medication 1 TABLET(S): at 10:11

## 2019-12-19 RX ADMIN — Medication 25 MILLIGRAM(S): at 04:35

## 2019-12-19 NOTE — DISCHARGE NOTE ANTEPARTUM - MEDICATION SUMMARY - MEDICATIONS TO TAKE
I will START or STAY ON the medications listed below when I get home from the hospital:    PNV Prenatal oral tablet  -- 1 tab(s) by mouth once a day  -- Indication: For Home

## 2019-12-19 NOTE — DISCHARGE NOTE ANTEPARTUM - PLAN OF CARE
S:    33 yo  at 27 5/7 wks here for OB check. Doing well, + FM, no vaginal bleeding, no LOF. No HA/visual changes/LE edema. Hesitant to get both flu shot and Tdap today--wondering about safety of flu shot given recent news stories.     O:  Visit Vitals  /60 (BP Location: [facility], Patient Position: Sitting, Cuff Size: Regular)   Pulse 74   Temp 97.4 °F (36.3 °C) (Tympanic)   Resp 16   Ht 5' 6\" (1.676 m)   Wt 78.9 kg   LMP 2017 (Exact Date)   SpO2 99%   BMI 28.09 kg/m²     General: Patient is alert and oriented x 3, no acute distress.  Well-groomed, appropriate mood and affect, good eye contact.   .  Extremities: no edema.  FH :28    A/P:     33 yo  at 27 5/7 weeks by certain LMP. Doing well. Rh negative.      P:  - routine PNC  - cont PNV's  - cont healthy lifestyle practices  - declined 1st TM screening and quad screen  - Rh neg--Type & Screen draw today  - A/P:     33 yo  at 23 5/7 weeks by certain LMP. Doing well.      P:  - routine PNC  - cont PNV's  - cont healthy lifestyle practices  - declined 1st TM screening and quad screen  - Rh neg--Type & Screen and RhoGam at 28 weeks and after delivery if indicated  - 1 hr GTT done today  - Tdap offered today--declined, wants to get flu shot first, then Tdap in future  - Flu vaccine discussed--recommendation for flu shot in pregnancy has not changed, in addition concern was with flu shots given in first TM  - reviewed s/sx miscarriage  - will be seeing colleague Dr. Fitzgerald as well in future, who will be present for her delivery    Education: Discussed above plan with patient who states her understanding.     RTC: 2 weeks and prn    Samantha Curiel MD         - RhoGam x 1 IM administered in clinic today    - Tdap after 28 weeks  - Flu vaccine in fall  - reviewed s/sx miscarriage  - will be seeing colleague Dr. Fitzgerald as well in future, who will be present for her delivery        Continuation of pregnancy Continue to follow with your OB doctor as instructed.  Please return to the hospital if you experience regular cramping/contractions, vaginal bleeding, leaking of fluid w concern that your water broke, or if you feel your baby moving less than usual.

## 2019-12-19 NOTE — PROGRESS NOTE ADULT - SUBJECTIVE AND OBJECTIVE BOX
R3 Antepartum Note - HD#3    28yo  at 27w3d admitted w  contractions, s/p betamethasone, indocin, magnesium, amp.  HD#3    S: Patient seen and examined at bedside, no acute overnight events. No acute complaints.     Pt reports good fetal movement.  Denies leakage of fluid, contractions, vaginal bleeding.       Denies CP, SOB, N/V, fevers, and chills.    O:   Vital Signs Last 24 Hours  T(C): 36.7 (19 @ 06:42), Max: 36.9 (19 @ 20:04)  HR: 83 (19 @ 06:42) (66 - 113)  BP: 103/62 (19 @ 06:42) (86/48 - 108/64)  RR: 17 (19 @ 06:42) (17 - 18)  SpO2: 100% (19 @ 06:42) (90% - 100%)        Physical Exam:  Cardio: nl S1/S2, RRR  Pulm: CTABL  General: NAD  Abdomen: Soft, non-tender, gravid  Ext: No pain or swelling    Labs:             12.0   17.64 )-----------( 285      (  @ 06:55 )             37.4      @ :55    135  |  102  |  7   ----------------------------<  137  4.0   |  20  |  0.51    Ca    7.9       @ 06:55  Mg     5.2      @ 13:00    TPro  7.0  /  Alb  3.5  /  TBili  < 0.2  /  DBili  x   /  AST  31  /  ALT  35  /  AlkPhos  65   @ 06:55    PT/INR - (  @ 06:55 )   PT: 10.4 SEC;   INR: 0.94     PTT - (  @ 06:55 )  PTT:24.9 SEC    Uric Acid: ( @ :55)  2.5      Fibrinogen: ( @ :55)  551.0    LDH: (:55)  234        MEDICATIONS  (STANDING):  indomethacin 25 milliGRAM(s) Oral every 6 hours  prenatal multivitamin 1 Tablet(s) Oral daily    MEDICATIONS  (PRN):

## 2019-12-19 NOTE — DISCHARGE NOTE ANTEPARTUM - CARE PROVIDER_API CALL
Henry Sandoval)  Obstetrics and Gynecology  [address] [place] [address]  Phone: [phone]  Fax: [phone]  Follow Up Time:

## 2019-12-19 NOTE — PROGRESS NOTE ADULT - PROBLEM SELECTOR PLAN 1
Neuro: c/w po pain meds  CV: Hemodynamically stable  Pulm: Saturating well on room air, encourage oob/amb  GI: c/w regular diet  : voiding spontaneously  Heme: c/w ambulation and SCDs for DVT ppx  Fetus: c/w BID NST, s/p betamethasone for fetal lung maturity  Dispo: Continue routine antepartum care, discharge planning for today  Pt seen w Dr. Fleischer, counselled on  labor precautions  Will discuss w attending regarding discharge today    TAQUERIA Alonso PGY3
1.   labor  -c/wbetamethasone for fetal lung maturity (- )  -c/w ampicillin for GBS prophylaxis (- )  -c/w magnesium for fetal neuroprotection  -c/w Indomethacin for tocolysis  -Monitor for worsening contractions or pressure    2.  Fetal well-being  -continuous EFM/[place]   -ATU sono in AM  -cw PNV  -s/p NICU consult     3.  Maternal Well-being  -Clear Diet  -SCDs, ambulation for DVT prophylaxis    Kbeetham PGY3

## 2019-12-19 NOTE — DISCHARGE NOTE ANTEPARTUM - CARE PLAN
Principal Discharge DX:	 contractions  Goal:	Continuation of pregnancy  Assessment and plan of treatment:	Continue to follow with your OB doctor as instructed.  Please return to the hospital if you experience regular cramping/contractions, vaginal bleeding, leaking of fluid w concern that your water broke, or if you feel your baby moving less than usual.

## 2019-12-19 NOTE — DISCHARGE NOTE ANTEPARTUM - PATIENT PORTAL LINK FT
You can access the FollowMyHealth Patient Portal offered by [facility] by registering at the following website: [email or web address]. By joining [employer]’s FollowMyHealth portal, you will also be able to view your health information using other applications (apps) compatible with our system.

## 2019-12-19 NOTE — PROGRESS NOTE ADULT - ASSESSMENT
28yo  at 27w3d admitted w  contractions, s/p betamethasone, indocin, magnesium, amp.  HD#3
AP 29y G P at 27w2d admitted with  labor with cervical change overnight from 1 to 3cm.

## 2019-12-19 NOTE — DISCHARGE NOTE ANTEPARTUM - PROVIDER TOKENS
[facility]    [address] [place] [address]    Phone:  [phone]           Wilfrid Burnham   MRN: [id number]    Department:  [facility]   Date of Visit:  1/28/2017           Diagnosis     Other infective otitis externa of left ear        You were seen by Sp Lr MD.      Disclaimer     Follow-up Care:  It is your responsibility to arrange for follow-up care with your healthcare provider or as instructed. Call to get an appointment time.           Contact your doctor for follow-up appointment if not already scheduled.     Follow up with Oziel Miranda MD.    Specialty:  Family Practice    Contact information    [address] [place] [address]  [phone]        Medications you received while in the ED through 01/28/2017  9:28 PM     None         What to Do with Your Medications      ASK your doctor about these medications        Details    * neomycin-polymyxin-HC 1 % otic solution   What changed:  Another medication with the same name was added. Make sure you understand how and when to take each.   Ask about: Which instructions should I use?        4 drops in affected ear 4 times daily for 7 days   Authorizing Provider:  Nhan Harper       * neomycin-polymyxin-HC 1 % otic solution   What changed:  You were already taking a medication with the same name, and this prescription was added. Make sure you understand how and when to take each.   Ask about: Which instructions should I use?        Place 3 drops in ear(s) 3 times daily.   Authorizing Provider:  Sp Lr                        * Notice:  This list has 2 medication(s) that are the same as other medications prescribed for you. Read the directions carefully, and ask your doctor or other care provider to review them with you.         Where to Get Your Medications      These medications were sent to [id number] - [address]  [address] [facility], [address], [facility] [address]     Phone:  [phone]     neomycin-polymyxin-HC 1 % otic solution               Procedures     None      Imaging Results     None        Discharge Instructions         Antibiotic drops as prescribed  Over-the-counter pain medications like acetaminophen and Aleve  Follow up with your doctor      External Ear Infection (Adult)    External otitis (also called “swimmer’s ear”) is an infection in the ear canal. It is often caused by bacteria or fungus. It can occur a few days after water gets trapped in the ear canal (from swimming or bathing). It can also occur after cleaning too deeply in the ear canal with a cotton swab or other object. Sometimes, hair care products get into the ear canal and cause this problem.  Symptoms can include pain, fever, itching, redness, drainage, or swelling of the ear canal. Temporary hearing loss may also occur.  Home care  · Do not try to clean the ear canal. This can push pus and bacteria deeper into the canal.  · Use prescribed ear drops as directed. These help reduce swelling and fight the infection. If an ear wick was placed in the ear canal, apply drops right onto the end of the wick. The wick will draw the medication into the ear canal even if it is swollen closed.  · A cotton ball may be loosely placed in the outer ear to absorb any drainage.  · You may use acetaminophen or ibuprofen to control pain, unless another medication was prescribed. Note: If you have chronic liver or kidney disease or ever had a stomach ulcer or GI bleeding, talk to your health care provider before taking any of these medications.  · Do not allow water to get into your ear when bathing. Also, avoid swimming until the infection has cleared.  Prevention  · Keep your ears dry. This helps lower the risk of infection. Dry your ears with a towel or hair dryer after getting wet. Also, use ear plugs when swimming.  · Do not stick any objects in the ear  to remove wax.  · If you feel water trapped in your ear, use ear drops right away. You can get these drops over the counter at most drugstores.  They work by removing water from the ear canal.  Follow-up care  Follow up with your health care provider in one week, or as advised.   When to seek medical advice  Call your health care provider right away if any of these occur:  · Ear pain becomes worse or doesn’t improve after 3 days of treatment  · Redness or swelling of the outer ear occurs or gets worse  · Headache  · Painful or stiff neck  · Drowsiness or confusion  · Fever of 100.4ºF (38ºC) or higher, or as directed by your health care provider  · Seizure  © 7271-8638 [employer]. [address] [place] [address], [place] [address]. All rights reserved. This information is not intended as a substitute for professional medical care. Always follow your healthcare professional's instructions.          Discharge References/Attachments     None      Medication Safety: What you need to know     Maintain Security - It is important to keep all medications in a secure location:  Keep out of the reach of children and pets    Consider using a lock box or locked filing cabinet    Place pill bottles in private area such as bedroom or drawer    Don't Share - It is illegal to share your prescription medication, even with family:  The doctor prescribes medications specifically for you and your body    You cannot be sure how the drug may affect others physically or emotionally    It is a criminal offense to share prescriptions    Proper Disposal - It is no longer acceptable to flush or throw away medications:  Recent studies show measurable amounts of medication have been found in drinking water and wildlife due to flushing or throwing away medications    Medication strength changes over time and is not typically safe after one year    Proper disposal removes the medication from your home in a safe way so that others don't have  access to it. Use your local drug drop site.    Your local pharmacy can provide information on medication disposal options in your community. The Department of Justice Drug Enforcement Administration website also has information on safe medication disposal:  www.deadiversion.[facility].gov/drug_disposal/index.html         PROVIDER:[TOKEN:[2610:MIIS:2616]]

## 2019-12-19 NOTE — DISCHARGE NOTE ANTEPARTUM - HOSPITAL COURSE
Patient was admitted to the hospital with  contractions, was found to be making cervical change.  Patient received a course of betamethasone, magnesium, ampicillin and indocin.  Contractions stopped, cervix was reexamined and found to be unchanged.  Patient to follow-up outpatient with her OB within a week.

## 2019-12-20 LAB — GP B STREP GENITAL QL CULT: SIGNIFICANT CHANGE UP

## 2019-12-24 NOTE — OB RN PATIENT PROFILE - NS_ADMITVITALS_OBGYN_ALL_OB_DT
Nutrition Services: Telephone Encounter  Called to check-in on patient since having treatments on Sunday when RD not here. Called patient but she did not answer.  RD left voice message with contact information and briefly discussed role.      This is 2nd attempt to reach pt, will remain available PRN.     [phone]     17-Dec-2019 23:36

## 2019-12-28 ENCOUNTER — OUTPATIENT (OUTPATIENT)
Dept: INPATIENT UNIT | Facility: HOSPITAL | Age: 29
LOS: 1 days | Discharge: HOME | End: 2019-12-28
Payer: COMMERCIAL

## 2019-12-28 VITALS
SYSTOLIC BLOOD PRESSURE: 104 MMHG | DIASTOLIC BLOOD PRESSURE: 64 MMHG | TEMPERATURE: 98 F | RESPIRATION RATE: 14 BRPM | HEART RATE: 96 BPM

## 2019-12-28 VITALS — DIASTOLIC BLOOD PRESSURE: 64 MMHG | HEART RATE: 96 BPM | SYSTOLIC BLOOD PRESSURE: 104 MMHG

## 2019-12-28 DIAGNOSIS — O26.899 OTHER SPECIFIED PREGNANCY RELATED CONDITIONS, UNSPECIFIED TRIMESTER: ICD-10-CM

## 2019-12-28 DIAGNOSIS — Z3A.00 WEEKS OF GESTATION OF PREGNANCY NOT SPECIFIED: ICD-10-CM

## 2019-12-28 DIAGNOSIS — Z98.890 OTHER SPECIFIED POSTPROCEDURAL STATES: Chronic | ICD-10-CM

## 2019-12-28 PROBLEM — R87.619 UNSPECIFIED ABNORMAL CYTOLOGICAL FINDINGS IN SPECIMENS FROM CERVIX UTERI: Chronic | Status: ACTIVE | Noted: 2019-12-17

## 2019-12-28 PROBLEM — R74.8 ABNORMAL LEVELS OF OTHER SERUM ENZYMES: Chronic | Status: ACTIVE | Noted: 2019-12-17

## 2019-12-28 LAB — AMNISURE ROM (RUPTURE OF MEMBRANES): NEGATIVE — SIGNIFICANT CHANGE UP

## 2019-12-28 PROCEDURE — 76805 OB US >/= 14 WKS SNGL FETUS: CPT | Mod: 26

## 2019-12-28 PROCEDURE — 59025 FETAL NON-STRESS TEST: CPT | Mod: 26

## 2019-12-28 NOTE — OB PROVIDER TRIAGE NOTE - CURRENT PREGNANCY COMPLICATIONS, OB PROFILE
Incompetent Cervix/Cervical Insufficiency/Gestational Age less than 36 Weeks/pt admitted on 12/17 s/p mag/beta/amp

## 2019-12-28 NOTE — OB PROVIDER TRIAGE NOTE - HISTORY OF PRESENT ILLNESS
Patient is a 28y/o  @ 28 5/7wks gest. who reports to triage with c/o LOF @ 0300.    Denies contractions or vaginal bleeding.  Denies recent coitus.    Reports positive fetal movements.    AP Course;  admitted on  for ptl, was 1-2cm dilated; s/p magnesium sulfate and betamethasone  Meds - pnv  NKDA

## 2019-12-28 NOTE — OB PROVIDER TRIAGE NOTE - NSOBPROVIDERNOTE_OBGYN_ALL_OB_FT
Patient is a 30y/o  @ 28 5/7wks gest. who reports to triage with c/o LOF @ 0300.    Denies contractions or vaginal bleeding.  Denies recent coitus.    Reports positive fetal movements.    AP Course;  admitted on  for ptl, was 1-2cm dilated; s/p magnesium sulfate and betamethasone  Meds - pnv  NKDA      PMH/PSH/SH - denies  OB - present  GYN - Abnormal pap at 1st pn - to f/u pp    Vital Signs Last 24 Hrs  T(C): 36.8 (28 Dec 2019 11:13), Max: 36.8 (28 Dec 2019 10:59)  HR: 96 (28 Dec 2019 11:16) (96 - 96)  BP: 104/64 (28 Dec 2019 11:16) (104/64 - 104/64)  RR: 14 (28 Dec 2019 10:59) (14 - 14)    Abdomen gravid, soft and nontender  NST -  SSE - neg pooling/eqiv nitrazine/neg ferning  Cervix appears to be 1-2cm on inspection.  Amnisure sent  SVE - deferred  TAS - Vtx/ant plac/julia        evidence of ROM  Next appointment with pnc provider is on 19 Patient is a 28y/o  @ 28 5/7wks gest. who reports to triage with c/o LOF @ 0300.    Denies contractions or vaginal bleeding.  Denies recent coitus.    Reports positive fetal movements.    AP Course;  admitted on  for ptl, was 1-2cm dilated; s/p magnesium sulfate and betamethasone  Meds - pnv  NKDA      PMH/PSH/SH - denies  OB - present  GYN - Abnormal pap at 1st pn - to f/u pp    Vital Signs Last 24 Hrs  T(C): 36.8 (28 Dec 2019 11:13), Max: 36.8 (28 Dec 2019 10:59)  HR: 96 (28 Dec 2019 11:16) (96 - 96)  BP: 104/64 (28 Dec 2019 11:16) (104/64 - 104/64)  RR: 14 (28 Dec 2019 10:59) (14 - 14)    Abdomen gravid, soft and nontender  NST - NST - cat 1; no ctx noted or palpated  SSE - neg pooling/eqiv nitrazine/neg ferning  Cervix appears to be 1-2cm on inspection.  Amnisure sent - negative  SVE - deferred  TAS - Vtx/ant plac/julia 19.19  No evidence of ROM    Discussed patient with Dr Rodriguez.  Plan:  Discharge home.  Next appointment with pnc provider is on 19  Instructed to f/u as scheduled.

## 2019-12-28 NOTE — OB PROVIDER TRIAGE NOTE - NSHPPHYSICALEXAM_GEN_ALL_CORE
Vital Signs Last 24 Hrs  T(C): 36.8 (28 Dec 2019 11:13), Max: 36.8 (28 Dec 2019 10:59)  HR: 96 (28 Dec 2019 11:16) (96 - 96)  BP: 104/64 (28 Dec 2019 11:16) (104/64 - 104/64)  RR: 14 (28 Dec 2019 10:59) (14 - 14)    Abdomen gravid, soft and nontender  NST -  SSE - neg pooling/eqiv nitrazine/neg ferning  Cervix appears to be 1-2cm on inspection.  Amnisure sent  SVE - deferred  TAS - Vtx/ant plac/julia Vital Signs Last 24 Hrs  T(C): 36.8 (28 Dec 2019 11:13), Max: 36.8 (28 Dec 2019 10:59)  HR: 96 (28 Dec 2019 11:16) (96 - 96)  BP: 104/64 (28 Dec 2019 11:16) (104/64 - 104/64)  RR: 14 (28 Dec 2019 10:59) (14 - 14)    Abdomen gravid, soft and nontender  NST - cat 1; no ctx noted or palpated  SSE - neg pooling/eqiv nitrazine/neg ferning  Cervix appears to be 1-2cm on inspection.  Amnisure sent - negative  SVE - deferred  TAS - Vtx/ant plac/julia

## 2019-12-28 NOTE — OB RN TRIAGE NOTE - CURRENT PREGNANCY COMPLICATIONS, OB PROFILE
Gestational Age less than 36 Weeks Incompetent Cervix/Cervical Insufficiency/Gestational Age less than 36 Weeks/pt admitted on 12/17 Incompetent Cervix/Cervical Insufficiency/Gestational Age less than 36 Weeks/pt admitted on 12/17 s/p mag/beta/amp

## 2020-01-24 DIAGNOSIS — O34.12 MATERNAL CARE FOR BENIGN TUMOR OF CORPUS UTERI, SECOND TRIMESTER: ICD-10-CM

## 2020-01-24 DIAGNOSIS — O34.32 MATERNAL CARE FOR CERVICAL INCOMPETENCE, SECOND TRIMESTER: ICD-10-CM

## 2020-01-24 DIAGNOSIS — Z3A.27 27 WEEKS GESTATION OF PREGNANCY: ICD-10-CM

## 2020-03-16 ENCOUNTER — OUTPATIENT (OUTPATIENT)
Dept: INPATIENT UNIT | Facility: HOSPITAL | Age: 30
LOS: 1 days | End: 2020-03-16

## 2020-03-16 ENCOUNTER — TRANSCRIPTION ENCOUNTER (OUTPATIENT)
Age: 30
End: 2020-03-16

## 2020-03-16 VITALS
TEMPERATURE: 99 F | HEART RATE: 78 BPM | HEIGHT: 62 IN | WEIGHT: 177.03 LBS | OXYGEN SATURATION: 100 % | RESPIRATION RATE: 16 BRPM | SYSTOLIC BLOOD PRESSURE: 115 MMHG | DIASTOLIC BLOOD PRESSURE: 68 MMHG

## 2020-03-16 VITALS — HEART RATE: 76 BPM | OXYGEN SATURATION: 98 %

## 2020-03-16 DIAGNOSIS — O26.899 OTHER SPECIFIED PREGNANCY RELATED CONDITIONS, UNSPECIFIED TRIMESTER: ICD-10-CM

## 2020-03-16 DIAGNOSIS — Z3A.00 WEEKS OF GESTATION OF PREGNANCY NOT SPECIFIED: ICD-10-CM

## 2020-03-16 DIAGNOSIS — Z98.890 OTHER SPECIFIED POSTPROCEDURAL STATES: Chronic | ICD-10-CM

## 2020-03-16 LAB
BASOPHILS # BLD AUTO: 0.06 K/UL — SIGNIFICANT CHANGE UP (ref 0–0.2)
BASOPHILS NFR BLD AUTO: 0.4 % — SIGNIFICANT CHANGE UP (ref 0–2)
BLD GP AB SCN SERPL QL: NEGATIVE — SIGNIFICANT CHANGE UP
EOSINOPHIL # BLD AUTO: 0.15 K/UL — SIGNIFICANT CHANGE UP (ref 0–0.5)
EOSINOPHIL NFR BLD AUTO: 1.1 % — SIGNIFICANT CHANGE UP (ref 0–6)
HCT VFR BLD CALC: 38.2 % — SIGNIFICANT CHANGE UP (ref 34.5–45)
HGB BLD-MCNC: 12.4 G/DL — SIGNIFICANT CHANGE UP (ref 11.5–15.5)
IMM GRANULOCYTES NFR BLD AUTO: 1.1 % — SIGNIFICANT CHANGE UP (ref 0–1.5)
LYMPHOCYTES # BLD AUTO: 1.88 K/UL — SIGNIFICANT CHANGE UP (ref 1–3.3)
LYMPHOCYTES # BLD AUTO: 13.9 % — SIGNIFICANT CHANGE UP (ref 13–44)
MCHC RBC-ENTMCNC: 26.7 PG — LOW (ref 27–34)
MCHC RBC-ENTMCNC: 32.5 % — SIGNIFICANT CHANGE UP (ref 32–36)
MCV RBC AUTO: 82.2 FL — SIGNIFICANT CHANGE UP (ref 80–100)
MONOCYTES # BLD AUTO: 1.28 K/UL — HIGH (ref 0–0.9)
MONOCYTES NFR BLD AUTO: 9.5 % — SIGNIFICANT CHANGE UP (ref 2–14)
NEUTROPHILS # BLD AUTO: 10.01 K/UL — HIGH (ref 1.8–7.4)
NEUTROPHILS NFR BLD AUTO: 74 % — SIGNIFICANT CHANGE UP (ref 43–77)
NRBC # FLD: 0 K/UL — SIGNIFICANT CHANGE UP (ref 0–0)
PLATELET # BLD AUTO: 235 K/UL — SIGNIFICANT CHANGE UP (ref 150–400)
PMV BLD: 10 FL — SIGNIFICANT CHANGE UP (ref 7–13)
RBC # BLD: 4.65 M/UL — SIGNIFICANT CHANGE UP (ref 3.8–5.2)
RBC # FLD: 13.9 % — SIGNIFICANT CHANGE UP (ref 10.3–14.5)
RH IG SCN BLD-IMP: POSITIVE — SIGNIFICANT CHANGE UP
WBC # BLD: 13.53 K/UL — HIGH (ref 3.8–10.5)
WBC # FLD AUTO: 13.53 K/UL — HIGH (ref 3.8–10.5)

## 2020-03-16 RX ORDER — AMPICILLIN TRIHYDRATE 250 MG
1 CAPSULE ORAL EVERY 4 HOURS
Refills: 0 | Status: DISCONTINUED | OUTPATIENT
Start: 2020-03-16 | End: 2020-03-16

## 2020-03-16 RX ORDER — AMPICILLIN TRIHYDRATE 250 MG
2 CAPSULE ORAL ONCE
Refills: 0 | Status: COMPLETED | OUTPATIENT
Start: 2020-03-16 | End: 2020-03-16

## 2020-03-16 RX ORDER — SODIUM CHLORIDE 9 MG/ML
1000 INJECTION, SOLUTION INTRAVENOUS
Refills: 0 | Status: DISCONTINUED | OUTPATIENT
Start: 2020-03-16 | End: 2020-03-16

## 2020-03-16 RX ORDER — OXYTOCIN 10 UNIT/ML
333.33 VIAL (ML) INJECTION
Qty: 20 | Refills: 0 | Status: DISCONTINUED | OUTPATIENT
Start: 2020-03-16 | End: 2020-03-16

## 2020-03-16 RX ADMIN — SODIUM CHLORIDE 125 MILLILITER(S): 9 INJECTION, SOLUTION INTRAVENOUS at 16:22

## 2020-03-16 RX ADMIN — Medication 216 GRAM(S): at 16:22

## 2020-03-16 NOTE — OB PROVIDER TRIAGE NOTE - CURRENT PREGNANCY COMPLICATIONS, OB PROFILE
Incompetent Cervix/Cervical Insufficiency/pt admitted on 12/17 s/p mag/beta/amp/Maternal Positive GBS

## 2020-03-16 NOTE — OB PROVIDER TRIAGE NOTE - NSOBPROVIDERNOTE_OBGYN_ALL_OB_FT
28 y/o   @ 40 wks gestation presents with c/o constant back pain and with irregular contractions states her pain is 5/10 on pain scale denies any LOF or VB reports +FM denies any n/v/d denies any fever or chills ap care comp by:  PTL - hospitalized - / magnesium/ steroids  GBS- + as per pt      abdomen: soft, nt on palp  SVE: 4.5/80/-3  T(C): 37.4 (20 @ 14:22), Max: 37.4 (20 @ 14:22)  HR: 80 (20 @ 15:23) (78 - 80)  BP: 105/67 (20 @ 15:23) (105/67 - 115/68)  RR: 16 (20 @ 14:22) (16 - 16)  SpO2: 100% (20 @ 14:22) (100% - 100%)  cat 1 fht  toco: every 2-5 minutes  GBS- + as  per pt   EFW: 3200 grams  d/w dr castro  admit to l&d  labor @ 40 wks gest / Ampicillin / expectant management  see admission orders      NKA  med hx: denies  surg hx: denies  gyn hx:  abnormal pap  ob hx: denies  meds: PNV      ht: 5'2  wt: 177 lbs

## 2020-03-16 NOTE — DISCHARGE NOTE ANTEPARTUM - PATIENT PORTAL LINK FT
You can access the FollowMyHealth Patient Portal offered by [facility] by registering at the following website: [email or web address]. By joining [employer]’s FollowMyHealth portal, you will also be able to view your health information using other applications (apps) compatible with our system.

## 2020-03-16 NOTE — OB PROVIDER H&P - ASSESSMENT
30 y/o   @ 40 wks gestation presents with c/o constant back pain and with irregular contractions states her pain is 5/10 on pain scale denies any LOF or VB reports +FM denies any n/v/d denies any fever or chills ap care comp by:  PTL - hospitalized - / magnesium/ steroids  GBS- + as per pt      abdomen: soft, nt on palp  SVE: 4.5/80/-3  T(C): 37.4 (20 @ 14:22), Max: 37.4 (20 @ 14:22)  HR: 80 (20 @ 15:23) (78 - 80)  BP: 105/67 (20 @ 15:23) (105/67 - 115/68)  RR: 16 (20 @ 14:22) (16 - 16)  SpO2: 100% (20 @ 14:22) (100% - 100%)  cat 1 fht  toco: every 2-5 minutes  GBS- + as  per pt   EFW: 3200 grams  d/w dr castro  admit to l&d  labor @ 40 wks gest / Ampicillin / expectant management  see admission orders      NKA  med hx: denies  surg hx: denies  gyn hx:  abnormal pap  ob hx: denies  meds: PNV      ht: 5'2  wt: 177 lbs

## 2020-03-16 NOTE — CHART NOTE - NSCHARTNOTEFT_GEN_A_CORE
ob attending    pt with occasional ctx  ve 4/80/-2  fht 120 accels no decels moderate variability  toco q 6-7 minutes  a/p cat 1 fht reassuring 40 weeks P0  same exam as in office this am  amp for gbs  pt to ambulate for two hours and will recheck exam    Kaur ELAINE

## 2020-03-16 NOTE — OB RN TRIAGE NOTE - CURRENT PREGNANCY COMPLICATIONS, OB PROFILE
Maternal Positive GBS/Incompetent Cervix/Cervical Insufficiency/pt admitted on 12/17 s/p mag/beta/amp

## 2020-03-16 NOTE — OB RN TRIAGE NOTE - ABORTIONS, OB PROFILE
Family history of sickle cell trait in father     Family history of sickle cell trait in mother     Sibling  Still living? Yes, Estimated age: Age Unknown  Family history of sickle cell disease, Age at diagnosis: Age Unknown  Family history of sarcoidosis, Age at diagnosis: Age Unknown 0

## 2020-03-16 NOTE — DISCHARGE NOTE ANTEPARTUM - CARE PROVIDER_API CALL
Brittney Gutierrez)  Obstetrics and Gynecology  [address] [place] [address]  Phone: [phone]  Fax: [phone]  Follow Up Time:

## 2020-03-16 NOTE — CHART NOTE - NSCHARTNOTEFT_GEN_A_CORE
ob ObGyn Attending MD Kaur    pt with irreg ctx q 7 min mild  ve unchanged  fht 120 accels no decels moderate variability  toco q 7 min  BPP 10/10  a/p cat 1 fht  offered pt dc home vs elective induction  pt wishes to dc home  strict instructions given  fu office wednesday if no labor    Kaur ELAINE

## 2020-03-16 NOTE — OB PROVIDER H&P - CURRENT PREGNANCY COMPLICATIONS, OB PROFILE
Maternal Positive GBS/pt admitted on 12/17 s/p mag/beta/amp/Incompetent Cervix/Cervical Insufficiency

## 2020-03-16 NOTE — OB PROVIDER H&P - NS_FHRDECEL_OBGYN_ALL_OB
I have ordered MRI but nothing is scheduled, perhaps he needs this ordered sooner. I think patient should be reassessed if pain is worsening.  I will give him a limited supply of hydrocodone to use. When he runs out covering provider may want to see him before refilling.    No Decelerations

## 2020-03-16 NOTE — CHART NOTE - NSCHARTNOTEFT_GEN_A_CORE
ob attending    pt comfortable unsure if still having back pain  ve 4/80/-2  fht on monitor now for 20 minute NST    a/p for NST and then will dc home as not in labor at this time  discussed with patient and     await NST    Kaur ELAINE

## 2020-03-16 NOTE — OB RN PATIENT PROFILE - PMH
Abnormal cervical Papanicolaou smear, unspecified abnormal pap finding  Diagnosed at 8 weeks pregnancy, for PP follow up  Elevated liver enzymes  Chronic   labor  Admitted at [facility] 19-19 for PTL, BAM

## 2020-03-17 LAB — T PALLIDUM AB TITR SER: NEGATIVE — SIGNIFICANT CHANGE UP

## 2020-03-19 ENCOUNTER — INPATIENT (INPATIENT)
Facility: HOSPITAL | Age: 30
LOS: 2 days | Discharge: HOME | End: 2020-03-22
Attending: SPECIALIST | Admitting: SPECIALIST

## 2020-03-19 VITALS
SYSTOLIC BLOOD PRESSURE: 131 MMHG | TEMPERATURE: 99 F | HEART RATE: 92 BPM | DIASTOLIC BLOOD PRESSURE: 83 MMHG | RESPIRATION RATE: 18 BRPM

## 2020-03-19 DIAGNOSIS — Z98.890 OTHER SPECIFIED POSTPROCEDURAL STATES: Chronic | ICD-10-CM

## 2020-03-19 DIAGNOSIS — O26.899 OTHER SPECIFIED PREGNANCY RELATED CONDITIONS, UNSPECIFIED TRIMESTER: ICD-10-CM

## 2020-03-19 DIAGNOSIS — Z3A.00 WEEKS OF GESTATION OF PREGNANCY NOT SPECIFIED: ICD-10-CM

## 2020-03-19 LAB
BASOPHILS # BLD AUTO: 0.06 K/UL — SIGNIFICANT CHANGE UP (ref 0–0.2)
BASOPHILS NFR BLD AUTO: 0.5 % — SIGNIFICANT CHANGE UP (ref 0–2)
BLD GP AB SCN SERPL QL: NEGATIVE — SIGNIFICANT CHANGE UP
EOSINOPHIL # BLD AUTO: 0.1 K/UL — SIGNIFICANT CHANGE UP (ref 0–0.5)
EOSINOPHIL NFR BLD AUTO: 0.8 % — SIGNIFICANT CHANGE UP (ref 0–6)
HCT VFR BLD CALC: 38.8 % — SIGNIFICANT CHANGE UP (ref 34.5–45)
HGB BLD-MCNC: 12.7 G/DL — SIGNIFICANT CHANGE UP (ref 11.5–15.5)
IMM GRANULOCYTES NFR BLD AUTO: 1 % — SIGNIFICANT CHANGE UP (ref 0–1.5)
LYMPHOCYTES # BLD AUTO: 1.99 K/UL — SIGNIFICANT CHANGE UP (ref 1–3.3)
LYMPHOCYTES # BLD AUTO: 15.5 % — SIGNIFICANT CHANGE UP (ref 13–44)
MCHC RBC-ENTMCNC: 26.7 PG — LOW (ref 27–34)
MCHC RBC-ENTMCNC: 32.7 % — SIGNIFICANT CHANGE UP (ref 32–36)
MCV RBC AUTO: 81.7 FL — SIGNIFICANT CHANGE UP (ref 80–100)
MONOCYTES # BLD AUTO: 1.21 K/UL — HIGH (ref 0–0.9)
MONOCYTES NFR BLD AUTO: 9.4 % — SIGNIFICANT CHANGE UP (ref 2–14)
NEUTROPHILS # BLD AUTO: 9.32 K/UL — HIGH (ref 1.8–7.4)
NEUTROPHILS NFR BLD AUTO: 72.8 % — SIGNIFICANT CHANGE UP (ref 43–77)
NRBC # FLD: 0 K/UL — SIGNIFICANT CHANGE UP (ref 0–0)
PLATELET # BLD AUTO: 264 K/UL — SIGNIFICANT CHANGE UP (ref 150–400)
PMV BLD: 10.6 FL — SIGNIFICANT CHANGE UP (ref 7–13)
RBC # BLD: 4.75 M/UL — SIGNIFICANT CHANGE UP (ref 3.8–5.2)
RBC # FLD: 14 % — SIGNIFICANT CHANGE UP (ref 10.3–14.5)
RH IG SCN BLD-IMP: POSITIVE — SIGNIFICANT CHANGE UP
WBC # BLD: 12.81 K/UL — HIGH (ref 3.8–10.5)
WBC # FLD AUTO: 12.81 K/UL — HIGH (ref 3.8–10.5)

## 2020-03-19 RX ORDER — AMPICILLIN TRIHYDRATE 250 MG
2 CAPSULE ORAL ONCE
Refills: 0 | Status: COMPLETED | OUTPATIENT
Start: 2020-03-19 | End: 2020-03-19

## 2020-03-19 RX ORDER — OXYTOCIN 10 UNIT/ML
333.33 VIAL (ML) INJECTION
Qty: 20 | Refills: 0 | Status: DISCONTINUED | OUTPATIENT
Start: 2020-03-19 | End: 2020-03-20

## 2020-03-19 RX ORDER — SODIUM CHLORIDE 9 MG/ML
1000 INJECTION, SOLUTION INTRAVENOUS
Refills: 0 | Status: DISCONTINUED | OUTPATIENT
Start: 2020-03-19 | End: 2020-03-20

## 2020-03-19 RX ORDER — AMPICILLIN TRIHYDRATE 250 MG
1 CAPSULE ORAL EVERY 4 HOURS
Refills: 0 | Status: DISCONTINUED | OUTPATIENT
Start: 2020-03-19 | End: 2020-03-20

## 2020-03-19 RX ADMIN — SODIUM CHLORIDE 125 MILLILITER(S): 9 INJECTION, SOLUTION INTRAVENOUS at 22:57

## 2020-03-19 RX ADMIN — Medication 216 GRAM(S): at 22:30

## 2020-03-19 NOTE — OB PROVIDER TRIAGE NOTE - CURRENT PREGNANCY COMPLICATIONS, OB PROFILE
pt admitted on 12/17 s/p mag/beta/amp/Maternal Positive GBS/Incompetent Cervix/Cervical Insufficiency

## 2020-03-19 NOTE — OB PROVIDER H&P - ASSESSMENT
pmh: denies  psh: denies  obhx: denies  gynhx: abnormal pap diagnosed at 8 weeks gestation    NKDA    Medications  PNV    Assessment  Vital Signs Last 24 Hrs  T(C): 37.2 (19 Mar 2020 20:26), Max: 37.2 (19 Mar 2020 20:26)  T(F): 99 (19 Mar 2020 20:26), Max: 99 (19 Mar 2020 20:26)  HR: 90 (19 Mar 2020 20:45) (90 - 92)  BP: 127/68 (19 Mar 2020 20:45) (127/68 - 131/83)  BP(mean): --  RR: 18 (19 Mar 2020 20:26) (18 - 18)  SpO2: --    regular heart rate; rhythm   lungs CTA     abdomen soft nontender gravid  (+) FHR; moderate variability; with accelerations  irregular uterine contractions noted on tocometer    Vaginal Exam: 4.5/70/-3; bulging membranes    while in triage patient experienced leakage of fluid  sterile speculum exam: (+) pooling, (+) ferning, (+) nitrazine  light meconium  Vaginal Exam: 5/80/-3    Plan  Admit to Labor and Delivery for Labor at Term  Routine Admission Labs  Epidural for Pain Management  GBS Prophylaxis     D/w Dr. Gutierrez & Dr. Barbosa PGY-4

## 2020-03-19 NOTE — OB PROVIDER TRIAGE NOTE - HISTORY OF PRESENT ILLNESS
29y.o.  G1 at 40.3w presenting with complaints of irregular uterine contractions since 1730 q3-4 minutes 9/10 pain; requesting pain management at this time. Patient reports positive fetal movement, denies leakage of fluid, denies vaginal bleeding.    a/p course complicated by  labor; admitted 2020- 2020 s/p Magnesium Sulfate, Betamethasone  patient also admitted on 3/16/2020 for pain management but discharged due to no evidence of labor at 40w; Vaginal Exam on discharge 4.5/80/-3    GBS (+)

## 2020-03-19 NOTE — OB RN TRIAGE NOTE - CURRENT PREGNANCY COMPLICATIONS, OB PROFILE
pt admitted on 12/17 s/p mag/beta/amp/Incompetent Cervix/Cervical Insufficiency/Maternal Positive GBS

## 2020-03-19 NOTE — OB RN PATIENT PROFILE - CURRENT PREGNANCY COMPLICATIONS, OB PROFILE
Incompetent Cervix/Cervical Insufficiency/Maternal Positive GBS/pt admitted on 12/17 s/p mag/beta/amp

## 2020-03-19 NOTE — OB PROVIDER TRIAGE NOTE - NSHPPHYSICALEXAM_GEN_ALL_CORE
Vital Signs Last 24 Hrs  T(C): 37.2 (19 Mar 2020 20:26), Max: 37.2 (19 Mar 2020 20:26)  T(F): 99 (19 Mar 2020 20:26), Max: 99 (19 Mar 2020 20:26)  HR: 90 (19 Mar 2020 20:45) (90 - 92)  BP: 127/68 (19 Mar 2020 20:45) (127/68 - 131/83)  BP(mean): --  RR: 18 (19 Mar 2020 20:26) (18 - 18)  SpO2: --    regular heart rate; rhythm   lungs CTA     abdomen soft nontender gravid  (+) FHR; moderate variability; with accelerations  irregular uterine contractions noted on tocometer    Vaginal Exam: 4.5/70/-3; bulging membranes

## 2020-03-19 NOTE — OB PROVIDER TRIAGE NOTE - NSOBPROVIDERNOTE_OBGYN_ALL_OB_FT
case reviewed with Dr. Gutierrez 2100; patient to be re-examined in 2 hours   for evidence of labor at this time case reviewed with Dr. Gutierrez 2100; patient to be re-examined in 2 hours   for evidence of labor at this time    evidence of Rupture of Membranes 2120; patient for admission    see full h&p

## 2020-03-19 NOTE — OB RN TRIAGE NOTE - PAIN SCALE PREFERRED, PROFILE
Patient Education     Back Spasm (No Trauma)    Spasm of the back muscles can occur after a sudden forceful twisting or bending force (such as in a car accident), after a simple awkward movement, or after lifting something heavy with poor body positioning. In any case, muscle spasm adds to the pain. Sleeping in an awkward position or on a poor quality mattress can also cause this. Some people respond to emotional stress by tensing the muscles of their back.  Pain that continues may need further evaluation or other types of treatment such as physical therapy.  You don't always need X-rays for the initial evaluation of back pain, unless you had a physical injury such as from a car accident or fall. If your pain continues and doesn't respond to medical treatment, X-rays and other tests may then be done.   Home care  · As soon as possible, start sitting or walking again to avoid problems from prolonged bed rest (muscle weakness, worsening back stiffness and pain, blood clots in the legs).  · When in bed, try to find a position of comfort. A firm mattress is best. Try lying flat on your back with pillows under your knees. You can also try lying on your side with your knees bent up toward your chest and a pillow between your knees.  · Avoid prolonged sitting, long car rides, or travel. This puts more stress on the lower back than standing or walking.   · During the first 24 to 72 hours after an injury or flare-up, apply an ice pack to the painful area for 20 minutes, then remove it for 20 minutes. Do this over a period of 60 to 90 minutes or several times a day. This will reduce swelling and pain. Always wrap ice packs in a thin towel.  · You can start with ice, then switch to heat. Heat (hot shower, hot bath, or heating pad) reduces pain, and works well for muscle spasms. Apply heat to the painful area for 20 minutes, then remove it for 20 minutes. Do this over a period of 60 to 90 minutes or several times a day. Do  not sleep on a heating pad as it can burn or damage skin.  · Alternate ice and heat therapies.  · Be aware of safe lifting methods and do not lift anything over 15 pounds until all the pain is gone.  Gentle stretching will help your back heal faster. Do this simple routine 2 to 3 times a day until your back is feeling better.  · Lie on your back with your knees bent and both feet on the ground  · Slowly raise your left knee to your chest as you flatten your lower back against the floor. Hold for 20 to 30 seconds.  · Relax and repeat the exercise with your right knee.  · Do 2 to 3 of these exercises for each leg.  · Repeat, hugging both knees to your chest at the same time.  · Do not bounce, but use a gentle pull.  Medicines  Talk to your doctor before using medicine, especially if you have other medical problems or are taking other medicines.  You may use acetaminophen or ibuprofen to control pain, unless your healthcare provider prescribed another pain medicine. If you have a chronic condition such as diabetes, liver or kidney disease, stomach ulcer, or gastrointestinal bleeding, or are taking blood thinners, talk with your healthcare provider before taking any medicines.  Be careful if you are given prescription pain medicine, narcotics, or medicine for muscle spasm. They can cause drowsiness, affect your coordination, reflexes, or judgment. Do not drive or operate heavy machinery when taking these medicines. Take pain medicine only as prescribed by your healthcare provider.  Follow-up care  Follow up with your doctor, or as advised. Physical therapy or further tests may be needed.  If X-rays were taken, they may be reviewed by a radiologist. You will be notified of any new findings that may affect your care.  Call 911  Call 911 if any of these occur:  · Trouble breathing  · Confusion  · Drowsiness or trouble awakening  · Fainting or loss of consciousness  · Rapid or very slow heart rate  · Loss of bowel or  bladder control  When to seek medical advice  Call your healthcare provider right away if any of these occur:  · Pain becomes worse or spreads to your legs  · Weakness or numbness in one or both legs  · Numbness in the groin or genital area  · Fever of 100.4ºF (38ºC) or higher, or as directed by your healthcare provider  · Burning or pain when passing urine  Date Last Reviewed: 6/1/2016  © 1172-0035 [employer]. [address] [place] [address], [place] [id number]. All rights reserved. This information is not intended as a substitute for professional medical care. Always follow your healthcare professional's instructions.            numerical 0-10

## 2020-03-20 PROBLEM — O60.00 PRETERM LABOR WITHOUT DELIVERY, UNSPECIFIED TRIMESTER: Chronic | Status: ACTIVE | Noted: 2020-03-16

## 2020-03-20 LAB — T PALLIDUM AB TITR SER: NEGATIVE — SIGNIFICANT CHANGE UP

## 2020-03-20 RX ORDER — OXYCODONE HYDROCHLORIDE 5 MG/1
5 TABLET ORAL ONCE
Refills: 0 | Status: DISCONTINUED | OUTPATIENT
Start: 2020-03-20 | End: 2020-03-22

## 2020-03-20 RX ORDER — OXYCODONE HYDROCHLORIDE 5 MG/1
5 TABLET ORAL
Refills: 0 | Status: DISCONTINUED | OUTPATIENT
Start: 2020-03-20 | End: 2020-03-22

## 2020-03-20 RX ORDER — DIPHENHYDRAMINE HCL 50 MG
25 CAPSULE ORAL EVERY 6 HOURS
Refills: 0 | Status: DISCONTINUED | OUTPATIENT
Start: 2020-03-20 | End: 2020-03-22

## 2020-03-20 RX ORDER — HYDROCORTISONE 1 %
1 OINTMENT (GRAM) TOPICAL EVERY 6 HOURS
Refills: 0 | Status: DISCONTINUED | OUTPATIENT
Start: 2020-03-20 | End: 2020-03-22

## 2020-03-20 RX ORDER — SODIUM CHLORIDE 9 MG/ML
3 INJECTION INTRAMUSCULAR; INTRAVENOUS; SUBCUTANEOUS EVERY 8 HOURS
Refills: 0 | Status: DISCONTINUED | OUTPATIENT
Start: 2020-03-20 | End: 2020-03-22

## 2020-03-20 RX ORDER — ACETAMINOPHEN 500 MG
975 TABLET ORAL
Refills: 0 | Status: DISCONTINUED | OUTPATIENT
Start: 2020-03-20 | End: 2020-03-22

## 2020-03-20 RX ORDER — GLYCERIN ADULT
1 SUPPOSITORY, RECTAL RECTAL AT BEDTIME
Refills: 0 | Status: DISCONTINUED | OUTPATIENT
Start: 2020-03-20 | End: 2020-03-22

## 2020-03-20 RX ORDER — KETOROLAC TROMETHAMINE 30 MG/ML
30 SYRINGE (ML) INJECTION ONCE
Refills: 0 | Status: DISCONTINUED | OUTPATIENT
Start: 2020-03-20 | End: 2020-03-20

## 2020-03-20 RX ORDER — MAGNESIUM HYDROXIDE 400 MG/1
30 TABLET, CHEWABLE ORAL
Refills: 0 | Status: DISCONTINUED | OUTPATIENT
Start: 2020-03-20 | End: 2020-03-22

## 2020-03-20 RX ORDER — OXYTOCIN 10 UNIT/ML
2 VIAL (ML) INJECTION
Qty: 30 | Refills: 0 | Status: DISCONTINUED | OUTPATIENT
Start: 2020-03-20 | End: 2020-03-20

## 2020-03-20 RX ORDER — LANOLIN
1 OINTMENT (GRAM) TOPICAL EVERY 6 HOURS
Refills: 0 | Status: DISCONTINUED | OUTPATIENT
Start: 2020-03-20 | End: 2020-03-22

## 2020-03-20 RX ORDER — AER TRAVELER 0.5 G/1
1 SOLUTION RECTAL; TOPICAL EVERY 4 HOURS
Refills: 0 | Status: DISCONTINUED | OUTPATIENT
Start: 2020-03-20 | End: 2020-03-22

## 2020-03-20 RX ORDER — BENZOCAINE 10 %
1 GEL (GRAM) MUCOUS MEMBRANE EVERY 6 HOURS
Refills: 0 | Status: DISCONTINUED | OUTPATIENT
Start: 2020-03-20 | End: 2020-03-22

## 2020-03-20 RX ORDER — SIMETHICONE 80 MG/1
80 TABLET, CHEWABLE ORAL EVERY 4 HOURS
Refills: 0 | Status: DISCONTINUED | OUTPATIENT
Start: 2020-03-20 | End: 2020-03-22

## 2020-03-20 RX ORDER — TETANUS TOXOID, REDUCED DIPHTHERIA TOXOID AND ACELLULAR PERTUSSIS VACCINE, ADSORBED 5; 2.5; 8; 8; 2.5 [IU]/.5ML; [IU]/.5ML; UG/.5ML; UG/.5ML; UG/.5ML
0.5 SUSPENSION INTRAMUSCULAR ONCE
Refills: 0 | Status: DISCONTINUED | OUTPATIENT
Start: 2020-03-20 | End: 2020-03-22

## 2020-03-20 RX ORDER — PRAMOXINE HYDROCHLORIDE 150 MG/15G
1 AEROSOL, FOAM RECTAL EVERY 4 HOURS
Refills: 0 | Status: DISCONTINUED | OUTPATIENT
Start: 2020-03-20 | End: 2020-03-22

## 2020-03-20 RX ORDER — DIBUCAINE 1 %
1 OINTMENT (GRAM) RECTAL EVERY 6 HOURS
Refills: 0 | Status: DISCONTINUED | OUTPATIENT
Start: 2020-03-20 | End: 2020-03-22

## 2020-03-20 RX ORDER — SODIUM CHLORIDE 9 MG/ML
1000 INJECTION, SOLUTION INTRAVENOUS ONCE
Refills: 0 | Status: COMPLETED | OUTPATIENT
Start: 2020-03-20 | End: 2020-03-20

## 2020-03-20 RX ORDER — OXYTOCIN 10 UNIT/ML
333.33 VIAL (ML) INJECTION
Qty: 20 | Refills: 0 | Status: DISCONTINUED | OUTPATIENT
Start: 2020-03-20 | End: 2020-03-21

## 2020-03-20 RX ORDER — IBUPROFEN 200 MG
600 TABLET ORAL EVERY 6 HOURS
Refills: 0 | Status: COMPLETED | OUTPATIENT
Start: 2020-03-20 | End: 2021-02-16

## 2020-03-20 RX ADMIN — Medication 108 GRAM(S): at 06:30

## 2020-03-20 RX ADMIN — Medication 30 MILLIGRAM(S): at 12:46

## 2020-03-20 RX ADMIN — Medication 1000 MILLIUNIT(S)/MIN: at 10:36

## 2020-03-20 RX ADMIN — Medication 108 GRAM(S): at 02:30

## 2020-03-20 RX ADMIN — Medication 2 MILLIUNIT(S)/MIN: at 02:43

## 2020-03-20 RX ADMIN — SODIUM CHLORIDE 1000 MILLILITER(S): 9 INJECTION, SOLUTION INTRAVENOUS at 13:31

## 2020-03-20 RX ADMIN — Medication 975 MILLIGRAM(S): at 22:30

## 2020-03-20 RX ADMIN — Medication 30 MILLIGRAM(S): at 13:10

## 2020-03-20 RX ADMIN — Medication 975 MILLIGRAM(S): at 21:42

## 2020-03-20 NOTE — PROVIDER CONTACT NOTE (OTHER) - SITUATION
Pt 3 hours post partum vaginal delivery. Pt Hypotensive after orthostatics completed. Lightheadedness/Dizziness upon standing/sitting. Slight nausea.

## 2020-03-20 NOTE — OB NEONATOLOGY/PEDIATRICIAN DELIVERY SUMMARY - NSPEDSNEONOTESA_OBGYN_ALL_OB_FT
Baby Hamlet is a 40 and 4 week F born via  to a [id number] mother. [place] is AB+, GBS+2/10, PNL neg (rubella equivocal). SROM light mec 21:23 3/19. Baby emerged crying, delayed cord clamping, WDSS, APGARS 9,9. NBN.

## 2020-03-20 NOTE — CHART NOTE - NSCHARTNOTEFT_GEN_A_CORE
ob ObGyn Attending MD Kaur    pt comfortable  ve 7/80/-2  fht 120 accels moderate variability no decels  toco q 4 min  a/p cat 1 fht reassuring  for pitocin  dw patient    Kaur ELAINE

## 2020-03-20 NOTE — CHART NOTE - NSCHARTNOTEFT_GEN_A_CORE
Went to examine pt c/o rectal pressure.  VE 9.5/100/-1.  Some caput noted on exam.  Pt repositioned Dr. Gutierrez updated.    Rochelle Harding MD

## 2020-03-20 NOTE — CHART NOTE - NSCHARTNOTEFT_GEN_A_CORE
Pt evaluated at bedside for SVE    VS  T(C): 37.0 (20 @ 04:27)  HR: 77 (20 @ 05:15)  BP: 105/63 (20 @ 05:05)  RR: 14 (20 @ 22:58)  SpO2: 100% (20 @ 05:15)    SVE: unchanged   FHT: 135 bpm, mod layne, +acel, -decel  [place]: q2-3    Plan:   Labor  Cont. Pit  4IUPC  Epi in place  Expect     d.w Dr. Matt Yoon PGY1

## 2020-03-20 NOTE — PROVIDER CONTACT NOTE (OTHER) - ASSESSMENT
AxOx3.   Uterus firm @ umbilicus with light bleeding.    Orthostatics:  Lying: BP  105/63   HR 81  Sitting:/71    HR 83  Standing:BP 94/56   HR 73    repeat blood pressure 89/52 HR 60 13:11

## 2020-03-20 NOTE — CHART NOTE - NSCHARTNOTEFT_GEN_A_CORE
R2 prog note    Pt examined due to increased rectal pressure       VE: 10/100/-1  EFM: 130/mod/+accels/-decels  [place]:Q3-4min      T(C): 37.1 (03-20-20 @ 06:11), Max: 37.2 (03-19-20 @ 20:26)  HR: 67 (03-20-20 @ 07:45) (57 - 96)  BP: 119/72 (03-20-20 @ 07:35) (90/51 - 152/58)  RR: 14 (03-19-20 @ 22:58) (14 - 18)  SpO2: 100% (03-20-20 @ 07:45) (96% - 100%)      Plan:  -Labor down with peanut ball  -Dr. Sandoval in house      D/w Dr. Lori izaguirre pgy-2

## 2020-03-20 NOTE — OB RN DELIVERY SUMMARY - NS_SEPSISRSKCALC_OBGYN_ALL_OB_FT
EOS calculated successfully. EOS Risk Factor: 0.5/1000 live births ([facility] national incidence); GA=40w4d; Temp=99; ROM=12.95; GBS='Positive'; Antibiotics='GBS specific antibiotics > 2 hrs prior to birth'

## 2020-03-20 NOTE — OB PROVIDER DELIVERY SUMMARY - NSPROVIDERDELIVERYNOTE_OBGYN_ALL_OB_FT
Spontaneous vaginal delivery of liveborn infant from OA position. Head, shoulders, and body delivered easily. Infant was suctioned. No mec. Delayed cord clamping and infant was passed to mother. Cord clamped and cut. Placenta delivered intact with a 3 vessel cord. Uterus explored and evacuated. Fundal massage was given and uterine fundus was found to be firm. Vaginal exam revealed an intact cervix, vaginal walls and sulci. Patient had a 2nd degree laceration in the perineum that was repaired with 2.0 chromic suture and R labial laceration repaired with 2.0 chromic. Excellent hemostasis was noted. Patient was stable and went to recovery. Count was correct x 2.    [place] PGY1

## 2020-03-20 NOTE — CHART NOTE - NSCHARTNOTEFT_GEN_A_CORE
Pt evaluated at bedside for SVE    VS  T(C): 37.1 (20 @ 00:31)  HR: 71 (20 @ 01:05)  BP: 94/52 (20 @ 01:05)  RR: 14 (20 @ 22:58)  SpO2: 99% (20 @ 01:10)    SVE: 7/90/-2  FHT: 130 bpm, mod layne, +acel, -decel  [place]: q3-4    Plan:  exp mgmt  FHT Cat 1  Expect     Dr. Matt Yoon PGY1

## 2020-03-21 ENCOUNTER — TRANSCRIPTION ENCOUNTER (OUTPATIENT)
Age: 30
End: 2020-03-21

## 2020-03-21 LAB
HCT VFR BLD CALC: 29.5 % — LOW (ref 34.5–45)
HGB BLD-MCNC: 9.6 G/DL — LOW (ref 11.5–15.5)

## 2020-03-21 RX ORDER — ACETAMINOPHEN 500 MG
3 TABLET ORAL
Qty: 0 | Refills: 0 | DISCHARGE
Start: 2020-03-21

## 2020-03-21 RX ORDER — IBUPROFEN 200 MG
600 TABLET ORAL EVERY 6 HOURS
Refills: 0 | Status: DISCONTINUED | OUTPATIENT
Start: 2020-03-21 | End: 2020-03-22

## 2020-03-21 RX ORDER — IBUPROFEN 200 MG
1 TABLET ORAL
Qty: 0 | Refills: 0 | DISCHARGE
Start: 2020-03-21

## 2020-03-21 RX ADMIN — Medication 975 MILLIGRAM(S): at 15:53

## 2020-03-21 RX ADMIN — Medication 600 MILLIGRAM(S): at 06:30

## 2020-03-21 RX ADMIN — SODIUM CHLORIDE 3 MILLILITER(S): 9 INJECTION INTRAMUSCULAR; INTRAVENOUS; SUBCUTANEOUS at 05:49

## 2020-03-21 RX ADMIN — Medication 600 MILLIGRAM(S): at 05:54

## 2020-03-21 RX ADMIN — Medication 975 MILLIGRAM(S): at 16:40

## 2020-03-21 RX ADMIN — Medication 600 MILLIGRAM(S): at 23:15

## 2020-03-21 RX ADMIN — Medication 600 MILLIGRAM(S): at 22:39

## 2020-03-21 RX ADMIN — Medication 600 MILLIGRAM(S): at 15:30

## 2020-03-21 RX ADMIN — Medication 600 MILLIGRAM(S): at 01:00

## 2020-03-21 RX ADMIN — Medication 600 MILLIGRAM(S): at 14:53

## 2020-03-21 RX ADMIN — Medication 600 MILLIGRAM(S): at 00:22

## 2020-03-21 RX ADMIN — SODIUM CHLORIDE 3 MILLILITER(S): 9 INJECTION INTRAMUSCULAR; INTRAVENOUS; SUBCUTANEOUS at 19:52

## 2020-03-21 NOTE — LACTATION INITIAL EVALUATION - INTERVENTION OUTCOME
verbalizes understanding/nbn demonstrated  deep latch and  performed  with sucking and swallowing  noted .   encouraged   to  offer more  breast  and ask  for  assistance as needed

## 2020-03-21 NOTE — DISCHARGE NOTE OB - PATIENT PORTAL LINK FT
You can access the FollowMyHealth Patient Portal offered by [facility] by registering at the following website: [email or web address]. By joining [facility]’s FollowMyHealth portal, you will also be able to view your health information using other applications (apps) compatible with our system.

## 2020-03-21 NOTE — PROVIDER CONTACT NOTE (OTHER) - RECOMMENDATIONS
Per  encourage patient to take pain medication and will evaluate.
71M with CAD s/p three stents in 2006, CABG x3 in 3/2016, left femoral endarterectomy and fem pop bypass (6/2017), left femoral to peroneal bypass with PTFE (8/7/18) for claudication, now POD 2 s/p left hallux amputation with podiatry presents with incisional pain.  Evidence of clotted bypass on venous duplex.    -no urgent vascular intervention at this time  -would recommend improved pain control from recent podiatric intervention
Give patient 1L LR bolus.

## 2020-03-21 NOTE — LACTATION INITIAL EVALUATION - LACTATION INTERVENTIONS
initiate hand expression routine/assisted with deep latch and positioning. discussed  signs  of  effective  feeding and  swallowing.  discussed  compression at  breast when  nbn  stops  drinking  and  is  still sucking.  instructed  to offer both  breast at a feeding ,feed on cue and safe  skin to skin.  . if  nbn  not  breastfeeding  effectively  hand  express  and  pump  and  give  expressed  milk  .  moderate  colostrum  on expression./initiate skin to skin

## 2020-03-21 NOTE — PROGRESS NOTE ADULT - SUBJECTIVE AND OBJECTIVE BOX
S: Patient doing well. Minimal lochia. Pain controlled. breastfeeding    O: Vital Signs Last 24 Hrs  T(C): 36.8 (21 Mar 2020 06:12), Max: 37.3 (20 Mar 2020 14:27)  T(F): 98.3 (21 Mar 2020 06:12), Max: 99.1 (20 Mar 2020 14:27)  HR: 94 (21 Mar 2020 06:12) (50 - 245)  BP: 113/75 (21 Mar 2020 06:12) (89/52 - 145/64)  BP(mean): --  RR: 18 (21 Mar 2020 06:12) (16 - 18)  SpO2: 98% (21 Mar 2020 06:12) (65% - 100%)    Gen: NAD  Abd: soft, NT, ND, fundus firm below umbilicus  Lochia: moderate  Ext: no tenderness    Labs:                        12.7   12.81 )-----------( 264      ( 19 Mar 2020 21:55 )             38.8       ABO Interpretation: AB ( @ 21:30)    Rh Interpretation: Positive ( @ 21:30)    Antibody Screen Negative      A: 29y PPD#1 s/p  doing well.     Plan: Continue routine postpartum care. Anticipate d/c home in am.

## 2020-03-21 NOTE — CHART NOTE - NSCHARTNOTEFT_GEN_A_CORE
R1 Event Note    S: Called to evaluate patient for hip/pelvic pain. Pt is PPD1 from . Pt reports immediately after delivery she started to experience bilateral hip pain/pelvic pain which she states is worse on the R>L. She also has low back pain. Pain is 3/10 at rest and 10/10 when ambulating. Pain does not radiate. She is unable to ambulate independently 2/2 the pain. Initially she was refusing PO pain medications, but recently took Ibuprofen and Tylenol with no relief. She denies fevers, chills, nausea vomiting, chest pain, SOB. She is tolerating PO, voiding spontaneously and denies heavy vaginal bleeding.     VS  T(C): 36.8 (20 @ 06:12)  HR: 94 (20 @ 06:12)  BP: 113/75 (20 @ 06:12)  RR: 18 (20 @ 06:12)  SpO2: 98% (20 @ 06:12)    Gen: Pt standing, attempting to ambulate, appears in pain  CV: RRR  Chest: CTAB  Ext: no tenderness to palpation along the inguinal region; ambulating with help only. Dragging feet which patient states is due to pain    A/P: 28yo  PPD1  with new onset bilateral hip and pelvic pain, likely MSK in nature 2/2 to maternal pushing effort during labor and delivery, possibly pelvic symphysis diastesis.   -Pelvic Xray to evaluate for PSD  -Encouraged regular PO pain medication use  -Encouraged heat packs for pain  -Ambulate with assistance until pt able to ambulate independently    To be d/w Dr. Cohen RFrankel PGY1

## 2020-03-21 NOTE — DISCHARGE NOTE OB - CARE PROVIDER_API CALL
Henry Sandoval)  Obstetrics and Gynecology  [address] [place] [address]  Phone: [phone]  Fax: [phone]  Follow Up Time:

## 2020-03-22 VITALS
DIASTOLIC BLOOD PRESSURE: 61 MMHG | SYSTOLIC BLOOD PRESSURE: 118 MMHG | TEMPERATURE: 98 F | RESPIRATION RATE: 17 BRPM | OXYGEN SATURATION: 99 % | HEART RATE: 68 BPM

## 2020-03-22 NOTE — PHYSICAL THERAPY INITIAL EVALUATION ADULT - DISCHARGE DISPOSITION, PT EVAL
no skilled physical therapy needs at this time. Pelvic floor outpatient physical therapy when cleared by MD.

## 2020-03-22 NOTE — PHYSICAL THERAPY INITIAL EVALUATION ADULT - PERTINENT HX OF CURRENT PROBLEM, REHAB EVAL
Patient is a 29 year old female s/p  PPD#2. Patient with bilateral hip pain. Patient agreeable to participate in physical therapy evaluation.

## 2020-03-22 NOTE — PROGRESS NOTE ADULT - ASSESSMENT
A/P: 28yo PPD#2 s/p . On PPD#1, pt complaining of bilateral hip pain (R>L) and difficulty walking likely 2/2 pubic symphysis separation. Pelvic XRay ordered however not yet performed

## 2020-03-22 NOTE — PROGRESS NOTE ADULT - SUBJECTIVE AND OBJECTIVE BOX
OB Progress Note:  PPD#2    S: 30yo PPD#2 s/p . Patient feels well. Pain is well controlled, tolerating regular diet, passing flatus, voiding spontaneously. She states she still has bilateral hip pain and it is hard for her to ambulate. She has been ambulating to the restroom with assistance. Denies heavy vaginal bleeding, CP/SOB, leadheadedness/dizziness, N/V.    O:  Vitals:   Vital Signs Last 24 Hrs  T(C): 36.8 (21 Mar 2020 17:23), Max: 36.8 (21 Mar 2020 06:12)  T(F): 98.3 (21 Mar 2020 17:23), Max: 98.3 (21 Mar 2020 06:12)  HR: 83 (21 Mar 2020 17:23) (83 - 94)  BP: 121/78 (21 Mar 2020 17:23) (113/75 - 121/78)  BP(mean): --  RR: 16 (21 Mar 2020 17:23) (16 - 18)  SpO2: 100% (21 Mar 2020 17:23) (98% - 100%)    MEDICATIONS  (STANDING):  acetaminophen   Tablet .. 975 milliGRAM(s) Oral <User Schedule>  diphtheria/tetanus/pertussis (acellular) Vaccine (ADAcel) 0.5 milliLiter(s) IntraMuscular once  ibuprofen  Tablet. 600 milliGRAM(s) Oral every 6 hours  prenatal multivitamin 1 Tablet(s) Oral daily  sodium chloride 0.9% lock flush 3 milliLiter(s) IV Push every 8 hours    MEDICATIONS  (PRN):  benzocaine 20%/menthol 0.5% Spray 1 Spray(s) Topical every 6 hours PRN for Perineal discomfort  dibucaine 1% Ointment 1 Application(s) Topical every 6 hours PRN Perineal discomfort  diphenhydrAMINE 25 milliGRAM(s) Oral every 6 hours PRN Pruritus  glycerin Suppository - Adult 1 Suppository(s) Rectal at bedtime PRN Constipation  hydrocortisone 1% Cream 1 Application(s) Topical every 6 hours PRN Moderate Pain (4-6)  lanolin Ointment 1 Application(s) Topical every 6 hours PRN nipple soreness  magnesium hydroxide Suspension 30 milliLiter(s) Oral two times a day PRN Constipation  oxyCODONE    IR 5 milliGRAM(s) Oral every 3 hours PRN Moderate to Severe Pain (4-10)  oxyCODONE    IR 5 milliGRAM(s) Oral once PRN Moderate to Severe Pain (4-10)  pramoxine 1%/zinc 5% Cream 1 Application(s) Topical every 4 hours PRN Moderate Pain (4-6)  simethicone 80 milliGRAM(s) Chew every 4 hours PRN Gas  witch hazel Pads 1 Application(s) Topical every 4 hours PRN Perineal discomfort      Labs:  Blood type: AB Positive  Rubella IgG: RPR: Negative                          9.6<L>   -- >-----------< --    (  @ 06:01 )             29.5<L>                        12.7   12.81<H> >-----------< 264    (  @ 21:55 )             38.8                  Physical Exam:  General: NAD  Abdomen: soft, non-tender, non-distended, fundus firm; pain with palpation of pubic symphysis   Vaginal: No heavy vaginal bleeding  Extremities: No erythema/edema

## 2020-03-22 NOTE — PHYSICAL THERAPY INITIAL EVALUATION ADULT - ADDITIONAL COMMENTS
Patient lives in a house, +5 steps to enter (bilateral HR).    Patient left seated in chair, all needs met and RN aware.

## 2020-03-22 NOTE — PHYSICAL THERAPY INITIAL EVALUATION ADULT - GENERAL OBSERVATIONS, REHAB EVAL
Patient received ambulating to bathroom with spouse, in no apparent distress. Patient agreeable to participate in physical therapy evaluation.

## 2020-03-22 NOTE — PHYSICAL THERAPY INITIAL EVALUATION ADULT - LEVEL OF INDEPENDENCE: STAIR NEGOTIATION, REHAB EVAL
Educated on ascending/descending side ways with bilateral hands on railing and spouse assist. If unable, ascend/descend on buttock.

## 2020-03-22 NOTE — PHYSICAL THERAPY INITIAL EVALUATION ADULT - REFERRING PHYSICIAN, REHAB EVAL
Mother's Blood Type:  B+ Infant's Blood Type: B+/Ciara negative. T bili 5 on 10/11 and phototherapy initiated. 10/17T bili 5.5 down from 6.4 10/18 decreased bili to 5.2 on single phototherapy; below recommended light level.   Plan: Disontinue phototherapy. Follow T bili in AM.    Frankel, Robyn

## 2020-03-22 NOTE — PROGRESS NOTE ADULT - PROBLEM SELECTOR PLAN 1
- Pain well controlled, continue current pain regimen  - Increase ambulation, SCDs when not ambulating  - Continue regular diet  - f/u need for pelvic X-ray  - Discharge planning     Khadijah Caban, PGY-1  will discuss with Lori

## 2020-03-22 NOTE — PHYSICAL THERAPY INITIAL EVALUATION ADULT - PATIENT PROFILE REVIEW, REHAB EVAL
PT orders received: Ambulate with assistance. Consult with RN Ruthann, patient may participate in PT evaluation./yes

## 2020-05-22 ENCOUNTER — APPOINTMENT (OUTPATIENT)
Dept: GYNECOLOGIC ONCOLOGY | Facility: CLINIC | Age: 30
End: 2020-05-22

## 2020-07-14 ENCOUNTER — APPOINTMENT (OUTPATIENT)
Dept: GYNECOLOGIC ONCOLOGY | Facility: CLINIC | Age: 30
End: 2020-07-14
Payer: COMMERCIAL

## 2020-07-14 VITALS
SYSTOLIC BLOOD PRESSURE: 107 MMHG | HEART RATE: 59 BPM | BODY MASS INDEX: 25.97 KG/M2 | WEIGHT: 141.13 LBS | DIASTOLIC BLOOD PRESSURE: 70 MMHG | HEIGHT: 62 IN

## 2020-07-14 DIAGNOSIS — R87.810 ATYPICAL SQUAMOUS CELLS OF UNDETERMINED SIGNIFICANCE ON CYTOLOGIC SMEAR OF CERVIX (ASC-US): ICD-10-CM

## 2020-07-14 DIAGNOSIS — R87.610 ATYPICAL SQUAMOUS CELLS OF UNDETERMINED SIGNIFICANCE ON CYTOLOGIC SMEAR OF CERVIX (ASC-US): ICD-10-CM

## 2020-07-14 PROCEDURE — 57454 BX/CURETT OF CERVIX W/SCOPE: CPT

## 2020-07-16 LAB — HPV HIGH+LOW RISK DNA PNL CVX: NOT DETECTED

## 2020-07-18 LAB — CYTOLOGY CVX/VAG DOC THIN PREP: ABNORMAL

## 2020-07-25 LAB — CORE LAB BIOPSY: NORMAL

## 2021-02-17 ENCOUNTER — RESULT REVIEW (OUTPATIENT)
Age: 31
End: 2021-02-17

## 2021-03-16 NOTE — OB PROVIDER TRIAGE NOTE - ADDITIONAL INSTRUCTIONS
No evidence of ROM  Plan:  Discharge home.  Next appointment with pnc provider is on 12/30/19  Instructed to f/u as scheduled. no

## 2021-03-23 NOTE — OB RN DELIVERY SUMMARY - NS_DELIVERYRN_OBGYN_ALL_OB_FT
Detail Level: Generalized
Instructions: This plan will send the code FBSE to the PM system.  DO NOT or CHANGE the price.
Shreya C
Price (Do Not Change): 0.00

## 2021-05-19 ENCOUNTER — APPOINTMENT (OUTPATIENT)
Dept: DISASTER EMERGENCY | Facility: OTHER | Age: 31
End: 2021-05-19
Payer: COMMERCIAL

## 2021-05-19 PROCEDURE — 0001A: CPT

## 2021-06-09 ENCOUNTER — APPOINTMENT (OUTPATIENT)
Dept: DISASTER EMERGENCY | Facility: OTHER | Age: 31
End: 2021-06-09
Payer: COMMERCIAL

## 2021-06-09 PROCEDURE — 0002A: CPT

## 2021-09-15 NOTE — LACTATION INITIAL EVALUATION - REQUESTED BY
patient/staff Padilla Ortiz  ORTHOPAEDIC SURGERY  [address] [place] [address]  Phone: [phone]  Fax: [phone]  Follow Up Time:

## 2021-11-16 NOTE — OB RN TRIAGE NOTE - MENTAL HEALTH CONDITIONS/SYMPTOMS, PROFILE
11/16/21        Zachery Tolbert  [address]  [facility] [address]     none

## 2022-03-07 ENCOUNTER — RESULT REVIEW (OUTPATIENT)
Age: 32
End: 2022-03-07

## 2022-05-20 NOTE — DISCHARGE NOTE OB - SWOLLEN AREA ON THE LEG THAT IS PAINFUL, RED OR HOT
Statement Selected
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 pain comes on and off more when keening  pt denies bowel or bladder issues notes had GYN eval and all was negative 2/2022

## 2022-05-25 NOTE — DISCHARGE NOTE OB - ABILITY TO HEAR (WITH HEARING AID OR HEARING APPLIANCE IF NORMALLY USED):
[facility]  [place] Discharge Summary                                                                             Name:  Elvie Olguin  :  2020  Hospital Day:  2  MRN:  [id number]  Attending:  Urszula Muñoz MD      Date of Delivery:  2022  Time of Delivery:  2:19 AM  Delivery Type:  Normal spontaneous vaginal delivery    Gestation:  37 5/7  Birth Weight:  Weight: 6 lb 2.4 oz (2.79 kg) (Filed from Delivery Summary)  Birth Information:  Height: 49.5 cm (1' 7.5\") (Filed from Delivery Summary)  Head Circumference: 33.5 cm (Filed from Delivery Summary)  Chest Circumference (cm): 1' 0.6\" (32 cm) (Filed from Delivery Summary)  Weight: 6 lb 2.4 oz (2.79 kg) (Filed from Delivery Summary)    Apgars:   1 Minute:  9      5 Minutes:  9     10 Minutes: Mother's Name: Nanette Lima:  Information for the patient's mother: Cordella Albino [[id number]]  [id number]    Pertinent Maternal Prenatal Labs:   Mother's Information  Mother: Eber Albino #[id number]   Start of Mother's Information    Prenatal Results    Initial Prenatal Labs ([facility] 1-84S)     Test Value Date Time    ABO Grouping OB  B  22 021    RH Factor OB  Positive  22 021    Antibody Screen OB  Negative  21 1344    Rubella Titer OB  Positive  21 1344    Hep B Surf Ag OB  Nonreactive   21 1344    Serology (RPR) OB       TREP       TREP Qual  Nonreactive   21 1344    T pallidum Antibodies       HIV Result OB       HIV Combo Result       5th Gen HIV - DMG  Nonreactive   21 1344    HGB  11.1 g/dL 21 1344    HCT  33.7 % 21 1344    MCV  93.1 fL 21 1344    Platelets  369 32^6/RL 21 1344    Urine Culture       Chlamydia with Pap  NOT DETECTED  21 1407    GC with Pap  NOT DETECTED  21 1407    Chlamydia       GC       Pap Smear       Sickel Cell Solubility HGB       HPV         2nd Trimester Labs (GA 24-41w)     Test Value Date Time    Antibody Screen OB  Negative  22 021 Serology (RPR) OB       HGB  10.1 g/dL 05/24/22 0639       12.2 g/dL 05/23/22 0219       10.7 g/dL 03/11/22 0848    HCT  31.3 % 05/24/22 0639       35.7 % 05/23/22 0219       33.6 % 03/11/22 0848    Glucose 1 hour  147 mg/dL 03/11/22 0848    Glucose Manoj 3 hr Gestational Fasting  81 mg/dL 03/15/22 1026    1 Hour glucose  157 mg/dL 03/15/22 1026    2 Hour glucose  137 mg/dL 03/15/22 1026    3 Hour glucose  119 mg/dL 03/15/22 1026      3rd Trimester Labs (GA 24-41w)     Test Value Date Time    Antibody Screen OB  Negative  05/23/22 0219    Group B Strep OB ^ Negative  05/06/22     Group B Strep Culture       GBS - DMG       HGB  10.1 g/dL 05/24/22 0639       12.2 g/dL 05/23/22 0219    HCT  31.3 % 05/24/22 0639       35.7 % 05/23/22 0219    HIV Result OB  Nonreactive  05/23/22 0218    HIV Combo Result       5th Gen HIV - DMG       TREP  Nonreactive   05/23/22 0219    T pallidum Antibodies       COVID19 Infection  Not Detected  05/23/22 0236      First Trimester & Genetic Testing (GA 0-40w)     Test Value Date Time    MaternaT-21 (T13)       MaternaT-21 (T18)       MaternaT-21 (T21)       VISIBILI T (T21)       VISIBILI T (T18)       Cystic Fibrosis Screen [32]       Cystic Fibrosis Screen [165]       Cystic Fibrosis Screen [165]       Cystic Fibrosis Screen [165]       Cystic Fibrosis Screen [165]       CVS       Counsyl [T13]       Counsyl [T18]       Counsyl [T21]         Genetic Screening (GA 0-45w)     Test Value Date Time    AFP Tetra-Patient's HCG       AFP Tetra-Mom for HCG       AFP Tetra-Patient's UE3       AFP Tetra-Mom for UE3       AFP Tetra-Patient's MARYLOU       AFP Tetra-Mom for MARYLOU       AFP Tetra-Patient's AFP       AFP Tetra-Mom for AFP       AFP, Spina Bifida       Quad Screen ([facility])       AFP       AFP, Tetra       AFP, Serum         Legend    ^: Historical              End of Mother's Information  Mother: Casi Gao #[id number]                Complications: none    Nursery Course: uncomplicated  Hearing Screen:    Hearing Screening  (Last 2 results in the past 4 days)    RIGHT EAR LEFT EAR RIGHT EAR 2ND LEFT EAR 2ND    (22)   Refer - AABR    (22)   Refer - AABR    (22)   Refer - AABR    (22)   Refer - AABR            Berlin Screen:  Berlin Metabolic Screening : Sent  Cardiac Screen:  CCHD Screening  Parent Education Provided: Yes  Age at Initial Screening (hours): 24  O2 Sat Right Hand (%): 99 %  O2 Sat Foot (%): 99 %  Difference: 0  Pass/Fail: Pass   Immunizations:   Immunization History  Administered            Date(s) Administered    HEP B, Ped/Adol       2022        TcB Results:    TCB   Date Value Ref Range Status   2022 5.70  Final   2022 4.60  Final   2022 2.70  Final         Discharge Weight: Wt Readings from Last 1 Encounters:  22 : 5 lb 13.3 oz (2.644 kg) (5 %, Z= -1.62)*    * Growth percentiles are based on WHO (Boys, 0-2 years) data. Weight Change Since Birth:  -5%    Void:  yes  Stool:  yes  Feeding: Upon admission, mother chose to exclusively use breastmilk to feed her infant    Physical Exam:  Gen:  Awake, alert, appropriate, nontoxic, in no apparent distress  Skin:   No rashes, no petechiae, jaundice to face  HEENT:  AFOSF, no eye discharge bilaterally, neck supple, no nasal discharge, no nasal flaring, no LAD, oral mucous membranes moist  Lungs:    CTA bilaterally, equal air entry, no wheezing, no coarseness  Chest:  S1, S2 no murmur  Abd:  Soft, nontender, nondistended, + bowel sounds, no HSM, no masses  Ext:  No cyanosis/edema/clubbing, peripheral pulses equal bilaterally, no clicks  Neuro:  +grasp, +suck, +prudence, good tone, no focal deficits  Spine:  No sacral dimples, no caleb noted  Hips:  Negative Ortolani's, negative Massey's, negative Galeazzi's, hip creases symmetrical, no clicks or clunks noted  :  Normal phallus, normal testes    Assessment:   Normal, healthy .   Refer hearing bilaterally x 2    Plan: Discharge home with mother.   Check CMV, repeat hearing in 3-4 weeks      Date of Discharge:  05/25/22     Julio Cesar Mixon MD Adequate: hears normal conversation without difficulty

## 2022-07-22 NOTE — OB PROVIDER H&P - NS_FETALPRESENTATIONA_OBGYN_ALL_OB
Agree w/ anticoag.  Not candidate for catheter based thrombolysis  based on current data.  Await results of echo to   confirm no reduced RV systolic dysfunction. Cephalic

## 2022-08-23 ENCOUNTER — ASOB RESULT (OUTPATIENT)
Age: 32
End: 2022-08-23

## 2022-08-23 ENCOUNTER — APPOINTMENT (OUTPATIENT)
Dept: ANTEPARTUM | Facility: CLINIC | Age: 32
End: 2022-08-23

## 2022-08-23 PROCEDURE — 76801 OB US < 14 WKS SINGLE FETUS: CPT | Mod: 59

## 2022-08-23 PROCEDURE — 76813 OB US NUCHAL MEAS 1 GEST: CPT

## 2022-09-06 ENCOUNTER — TRANSCRIPTION ENCOUNTER (OUTPATIENT)
Age: 32
End: 2022-09-06

## 2022-09-07 ENCOUNTER — APPOINTMENT (OUTPATIENT)
Dept: MATERNAL FETAL MEDICINE | Facility: CLINIC | Age: 32
End: 2022-09-07

## 2022-09-07 ENCOUNTER — ASOB RESULT (OUTPATIENT)
Age: 32
End: 2022-09-07

## 2022-09-07 PROCEDURE — 99205 OFFICE O/P NEW HI 60 MIN: CPT | Mod: 95

## 2022-10-11 ENCOUNTER — APPOINTMENT (OUTPATIENT)
Dept: ANTEPARTUM | Facility: CLINIC | Age: 32
End: 2022-10-11

## 2022-10-11 ENCOUNTER — ASOB RESULT (OUTPATIENT)
Age: 32
End: 2022-10-11

## 2022-10-11 PROCEDURE — 76817 TRANSVAGINAL US OBSTETRIC: CPT

## 2022-10-11 PROCEDURE — 76811 OB US DETAILED SNGL FETUS: CPT

## 2022-11-21 ENCOUNTER — OUTPATIENT (OUTPATIENT)
Dept: INPATIENT UNIT | Facility: HOSPITAL | Age: 32
LOS: 1 days | Discharge: HOME | End: 2022-11-21

## 2022-11-21 VITALS
DIASTOLIC BLOOD PRESSURE: 64 MMHG | SYSTOLIC BLOOD PRESSURE: 114 MMHG | TEMPERATURE: 99 F | HEART RATE: 94 BPM | RESPIRATION RATE: 16 BRPM

## 2022-11-21 VITALS — DIASTOLIC BLOOD PRESSURE: 58 MMHG | SYSTOLIC BLOOD PRESSURE: 101 MMHG | HEART RATE: 77 BPM

## 2022-11-21 DIAGNOSIS — O26.899 OTHER SPECIFIED PREGNANCY RELATED CONDITIONS, UNSPECIFIED TRIMESTER: ICD-10-CM

## 2022-11-21 DIAGNOSIS — Z98.890 OTHER SPECIFIED POSTPROCEDURAL STATES: Chronic | ICD-10-CM

## 2022-11-21 DIAGNOSIS — Z3A.00 WEEKS OF GESTATION OF PREGNANCY NOT SPECIFIED: ICD-10-CM

## 2022-11-21 LAB
APPEARANCE UR: CLEAR — SIGNIFICANT CHANGE UP
BILIRUB UR-MCNC: NEGATIVE — SIGNIFICANT CHANGE UP
COLOR SPEC: COLORLESS — SIGNIFICANT CHANGE UP
DIFF PNL FLD: NEGATIVE — SIGNIFICANT CHANGE UP
GLUCOSE UR QL: NEGATIVE — SIGNIFICANT CHANGE UP
KETONES UR-MCNC: NEGATIVE — SIGNIFICANT CHANGE UP
LEUKOCYTE ESTERASE UR-ACNC: NEGATIVE — SIGNIFICANT CHANGE UP
NITRITE UR-MCNC: NEGATIVE — SIGNIFICANT CHANGE UP
PH UR: 7 — SIGNIFICANT CHANGE UP (ref 5–8)
PROT UR-MCNC: NEGATIVE — SIGNIFICANT CHANGE UP
SP GR SPEC: 1.01 — LOW (ref 1.01–1.05)
UROBILINOGEN FLD QL: SIGNIFICANT CHANGE UP

## 2022-11-21 PROCEDURE — 99213 OFFICE O/P EST LOW 20 MIN: CPT | Mod: 25

## 2022-11-21 PROCEDURE — 76830 TRANSVAGINAL US NON-OB: CPT | Mod: 26

## 2022-11-21 NOTE — OB PROVIDER TRIAGE NOTE - NSOBPROVIDERNOTE_OBGYN_ALL_OB_FT
33 y/o  @ 24.6 wks gestation no evidence of PTL   likely with normal discomforts of pregnancy   maternal and fetal status reassuring   denies any abdominal cramping @ this time  plan of care d/w dr godinez   discharge home   PTL precautions d/w pt  increase fluid intake  instructed on fetal kickcounts  follow up with dr davis as sched   w/v discharge instructions given  discharged home

## 2022-11-21 NOTE — OB PROVIDER TRIAGE NOTE - NSHPLABSRESULTS_GEN_ALL_CORE
urinalysis urinalysis  Urinalysis Basic - ( 2022 12:38 )    Color: Colorless / Appearance: Clear / S.006 / pH: x  Gluc: x / Ketone: Negative  / Bili: Negative / Urobili: <2 mg/dL   Blood: x / Protein: Negative / Nitrite: Negative   Leuk Esterase: Negative / RBC: x / WBC x   Sq Epi: x / Non Sq Epi: x / Bacteria: x

## 2022-11-21 NOTE — OB PROVIDER TRIAGE NOTE - ADDITIONAL INSTRUCTIONS
likely with normal discomforts of pregnancy   maternal and fetal status reassuring   denies any abdominal cramping @ this time  plan of care d/w dr godinez   discharge home   PTL precautions d/w pt  increase fluid intake  instructed on fetal kickcounts  follow up with dr davis as sched   w/v discharge instructions given  discharged home

## 2022-11-21 NOTE — OB RN TRIAGE NOTE - NSICDXPASTMEDICALHX_GEN_ALL_CORE_FT
PAST MEDICAL HISTORY:  Abnormal cervical Papanicolaou smear, unspecified abnormal pap finding Diagnosed at 8 weeks pregnancy, for PP follow up    Elevated liver enzymes Chronic     labor Admitted at [facility] 19-19 for PTL, BAM

## 2022-11-21 NOTE — OB PROVIDER TRIAGE NOTE - NS_OBGYNHISTORY_OBGYN_ALL_OB_FT
OB HX:   3/20/20  FT 8-7 ,  PTL @ 27 wks  received mag/beta hospitalized x's 2 days and was on bedrest   GYN HX:   hx abnormal pap- colposcopy

## 2022-11-21 NOTE — OB PROVIDER TRIAGE NOTE - HISTORY OF PRESENT ILLNESS
31 y/o  @ 24.6 wks gestation presents with c/o abdominal menstrual like cramping yesterday denies any cramping today denies any VB or LOF reports +FM denies any n/v/d denies any fever or chills pt tolerating po fluids and solids pt reports has not had intercourse recently ap care uncomplicated thus far

## 2022-11-21 NOTE — OB RN TRIAGE NOTE - FALL HARM RISK - UNIVERSAL INTERVENTIONS
Bed in lowest position, wheels locked, appropriate side rails in place/Call bell, personal items and telephone in reach/Instruct patient to call for assistance before getting out of bed or chair/Non-slip footwear when patient is out of bed/[place] to call system/Physically safe environment - no spills, clutter or unnecessary equipment/Purposeful Proactive Rounding/Room/bathroom lighting operational, light cord in reach

## 2022-11-21 NOTE — OB PROVIDER TRIAGE NOTE - NSICDXPASTMEDICALHX_GEN_ALL_CORE_FT
PAST MEDICAL HISTORY:  Abnormal cervical Papanicolaou smear, unspecified abnormal pap finding Diagnosed at 8 weeks pregnancy, for PP follow up    Elevated liver enzymes Chronic     labor Admitted at [facility] 19-19 for PTL, BAM

## 2022-11-21 NOTE — OB PROVIDER TRIAGE NOTE - NSHPPHYSICALEXAM_GEN_ALL_CORE
abdomen: soft, nt on palp  SSE: cervix appears closed and posterior   no evidence of VB or pooling  TVS: 3.36-3.42 no dynamic changes  SVE: closed/50/-3  T(C): 37.2 (11-21-22 @ 12:16), Max: 37.2 (11-21-22 @ 12:02)  HR: 77 (11-21-22 @ 12:39) (77 - 94)  BP: 103/64 (11-21-22 @ 12:39) (103/64 - 114/64)  RR: 17 (11-21-22 @ 12:16) (16 - 17)  SpO2: --  NST in progress abdomen: soft, nt on palp  SSE: cervix appears closed and posterior   no evidence of VB or pooling  TVS: 3.36-3.42 no dynamic changes  SVE: closed/50/-3  T(C): 37.2 (11-21-22 @ 12:16), Max: 37.2 (11-21-22 @ 12:02)  HR: 77 (11-21-22 @ 12:39) (77 - 94)  BP: 103/64 (11-21-22 @ 12:39) (103/64 - 114/64)  RR: 17 (11-21-22 @ 12:16) (16 - 17)  SpO2: --  NST in progress    addendum @ 1305:  cat 1 FHT  toco: no uterine contractions noted   TAS: BPP: 8/8 vtx posterior placenta ALEXA: 14.68 FL: 4.55 cm GA :25 wks

## 2023-02-09 ENCOUNTER — INPATIENT (INPATIENT)
Facility: HOSPITAL | Age: 33
LOS: 1 days | Discharge: HOME | End: 2023-02-11
Attending: SPECIALIST | Admitting: SPECIALIST
Payer: COMMERCIAL

## 2023-02-09 ENCOUNTER — TRANSCRIPTION ENCOUNTER (OUTPATIENT)
Age: 33
End: 2023-02-09

## 2023-02-09 VITALS
HEART RATE: 97 BPM | TEMPERATURE: 98 F | SYSTOLIC BLOOD PRESSURE: 128 MMHG | DIASTOLIC BLOOD PRESSURE: 73 MMHG | RESPIRATION RATE: 16 BRPM

## 2023-02-09 DIAGNOSIS — O60.03 PRETERM LABOR WITHOUT DELIVERY, THIRD TRIMESTER: ICD-10-CM

## 2023-02-09 DIAGNOSIS — Z98.890 OTHER SPECIFIED POSTPROCEDURAL STATES: Chronic | ICD-10-CM

## 2023-02-09 DIAGNOSIS — O26.899 OTHER SPECIFIED PREGNANCY RELATED CONDITIONS, UNSPECIFIED TRIMESTER: ICD-10-CM

## 2023-02-09 LAB
ALBUMIN SERPL ELPH-MCNC: 3.4 G/DL — SIGNIFICANT CHANGE UP (ref 3.3–5)
ALP SERPL-CCNC: 127 U/L — HIGH (ref 40–120)
ALT FLD-CCNC: 13 U/L — SIGNIFICANT CHANGE UP (ref 4–33)
ANION GAP SERPL CALC-SCNC: 12 MMOL/L — SIGNIFICANT CHANGE UP (ref 7–14)
AST SERPL-CCNC: 15 U/L — SIGNIFICANT CHANGE UP (ref 4–32)
BASOPHILS # BLD AUTO: 0.08 K/UL — SIGNIFICANT CHANGE UP (ref 0–0.2)
BASOPHILS NFR BLD AUTO: 0.7 % — SIGNIFICANT CHANGE UP (ref 0–2)
BILIRUB SERPL-MCNC: <0.2 MG/DL — SIGNIFICANT CHANGE UP (ref 0.2–1.2)
BLD GP AB SCN SERPL QL: NEGATIVE — SIGNIFICANT CHANGE UP
BUN SERPL-MCNC: 9 MG/DL — SIGNIFICANT CHANGE UP (ref 7–23)
CALCIUM SERPL-MCNC: 9.3 MG/DL — SIGNIFICANT CHANGE UP (ref 8.4–10.5)
CHLORIDE SERPL-SCNC: 107 MMOL/L — SIGNIFICANT CHANGE UP (ref 98–107)
CO2 SERPL-SCNC: 19 MMOL/L — LOW (ref 22–31)
CREAT SERPL-MCNC: 0.46 MG/DL — LOW (ref 0.5–1.3)
EGFR: 132 ML/MIN/1.73M2 — SIGNIFICANT CHANGE UP
EOSINOPHIL # BLD AUTO: 0.18 K/UL — SIGNIFICANT CHANGE UP (ref 0–0.5)
EOSINOPHIL NFR BLD AUTO: 1.6 % — SIGNIFICANT CHANGE UP (ref 0–6)
GLUCOSE SERPL-MCNC: 85 MG/DL — SIGNIFICANT CHANGE UP (ref 70–99)
HCT VFR BLD CALC: 39.1 % — SIGNIFICANT CHANGE UP (ref 34.5–45)
HGB BLD-MCNC: 12.6 G/DL — SIGNIFICANT CHANGE UP (ref 11.5–15.5)
HIV 1+2 AB+HIV1 P24 AG SERPL QL IA: SIGNIFICANT CHANGE UP
IANC: 7.88 K/UL — HIGH (ref 1.8–7.4)
IMM GRANULOCYTES NFR BLD AUTO: 1.8 % — HIGH (ref 0–0.9)
LYMPHOCYTES # BLD AUTO: 18.7 % — SIGNIFICANT CHANGE UP (ref 13–44)
LYMPHOCYTES # BLD AUTO: 2.13 K/UL — SIGNIFICANT CHANGE UP (ref 1–3.3)
MCHC RBC-ENTMCNC: 26.3 PG — LOW (ref 27–34)
MCHC RBC-ENTMCNC: 32.2 GM/DL — SIGNIFICANT CHANGE UP (ref 32–36)
MCV RBC AUTO: 81.5 FL — SIGNIFICANT CHANGE UP (ref 80–100)
MONOCYTES # BLD AUTO: 0.92 K/UL — HIGH (ref 0–0.9)
MONOCYTES NFR BLD AUTO: 8.1 % — SIGNIFICANT CHANGE UP (ref 2–14)
NEUTROPHILS # BLD AUTO: 7.88 K/UL — HIGH (ref 1.8–7.4)
NEUTROPHILS NFR BLD AUTO: 69.1 % — SIGNIFICANT CHANGE UP (ref 43–77)
NRBC # BLD: 0 /100 WBCS — SIGNIFICANT CHANGE UP (ref 0–0)
NRBC # FLD: 0 K/UL — SIGNIFICANT CHANGE UP (ref 0–0)
PLATELET # BLD AUTO: 288 K/UL — SIGNIFICANT CHANGE UP (ref 150–400)
POTASSIUM SERPL-MCNC: 3.8 MMOL/L — SIGNIFICANT CHANGE UP (ref 3.5–5.3)
POTASSIUM SERPL-SCNC: 3.8 MMOL/L — SIGNIFICANT CHANGE UP (ref 3.5–5.3)
PROT SERPL-MCNC: 6.2 G/DL — SIGNIFICANT CHANGE UP (ref 6–8.3)
RBC # BLD: 4.8 M/UL — SIGNIFICANT CHANGE UP (ref 3.8–5.2)
RBC # FLD: 13.4 % — SIGNIFICANT CHANGE UP (ref 10.3–14.5)
RH IG SCN BLD-IMP: POSITIVE — SIGNIFICANT CHANGE UP
SODIUM SERPL-SCNC: 138 MMOL/L — SIGNIFICANT CHANGE UP (ref 135–145)
WBC # BLD: 11.39 K/UL — HIGH (ref 3.8–10.5)
WBC # FLD AUTO: 11.39 K/UL — HIGH (ref 3.8–10.5)

## 2023-02-09 PROCEDURE — 88307 TISSUE EXAM BY PATHOLOGIST: CPT | Mod: 26

## 2023-02-09 RX ORDER — KETOROLAC TROMETHAMINE 30 MG/ML
30 SYRINGE (ML) INJECTION ONCE
Refills: 0 | Status: DISCONTINUED | OUTPATIENT
Start: 2023-02-09 | End: 2023-02-10

## 2023-02-09 RX ORDER — AMPICILLIN TRIHYDRATE 250 MG
1 CAPSULE ORAL EVERY 4 HOURS
Refills: 0 | Status: DISCONTINUED | OUTPATIENT
Start: 2023-02-09 | End: 2023-02-10

## 2023-02-09 RX ORDER — TETANUS TOXOID, REDUCED DIPHTHERIA TOXOID AND ACELLULAR PERTUSSIS VACCINE, ADSORBED 5; 2.5; 8; 8; 2.5 [IU]/.5ML; [IU]/.5ML; UG/.5ML; UG/.5ML; UG/.5ML
0.5 SUSPENSION INTRAMUSCULAR ONCE
Refills: 0 | Status: DISCONTINUED | OUTPATIENT
Start: 2023-02-09 | End: 2023-02-11

## 2023-02-09 RX ORDER — AMPICILLIN TRIHYDRATE 250 MG
2 CAPSULE ORAL ONCE
Refills: 0 | Status: COMPLETED | OUTPATIENT
Start: 2023-02-09 | End: 2023-02-09

## 2023-02-09 RX ORDER — CHLORHEXIDINE GLUCONATE 213 G/1000ML
1 SOLUTION TOPICAL ONCE
Refills: 0 | Status: DISCONTINUED | OUTPATIENT
Start: 2023-02-09 | End: 2023-02-10

## 2023-02-09 RX ORDER — SODIUM CHLORIDE 9 MG/ML
3 INJECTION INTRAMUSCULAR; INTRAVENOUS; SUBCUTANEOUS EVERY 8 HOURS
Refills: 0 | Status: DISCONTINUED | OUTPATIENT
Start: 2023-02-09 | End: 2023-02-11

## 2023-02-09 RX ORDER — DIPHENHYDRAMINE HCL 50 MG
25 CAPSULE ORAL EVERY 6 HOURS
Refills: 0 | Status: DISCONTINUED | OUTPATIENT
Start: 2023-02-09 | End: 2023-02-11

## 2023-02-09 RX ORDER — DIBUCAINE 1 %
1 OINTMENT (GRAM) RECTAL EVERY 6 HOURS
Refills: 0 | Status: DISCONTINUED | OUTPATIENT
Start: 2023-02-09 | End: 2023-02-11

## 2023-02-09 RX ORDER — ACETAMINOPHEN 500 MG
975 TABLET ORAL EVERY 6 HOURS
Refills: 0 | Status: DISCONTINUED | OUTPATIENT
Start: 2023-02-09 | End: 2023-02-11

## 2023-02-09 RX ORDER — SODIUM CHLORIDE 9 MG/ML
1000 INJECTION, SOLUTION INTRAVENOUS
Refills: 0 | Status: DISCONTINUED | OUTPATIENT
Start: 2023-02-09 | End: 2023-02-10

## 2023-02-09 RX ORDER — MAGNESIUM HYDROXIDE 400 MG/1
30 TABLET, CHEWABLE ORAL
Refills: 0 | Status: DISCONTINUED | OUTPATIENT
Start: 2023-02-09 | End: 2023-02-11

## 2023-02-09 RX ORDER — HYDROCORTISONE 1 %
1 OINTMENT (GRAM) TOPICAL EVERY 6 HOURS
Refills: 0 | Status: DISCONTINUED | OUTPATIENT
Start: 2023-02-09 | End: 2023-02-11

## 2023-02-09 RX ORDER — AER TRAVELER 0.5 G/1
1 SOLUTION RECTAL; TOPICAL EVERY 4 HOURS
Refills: 0 | Status: DISCONTINUED | OUTPATIENT
Start: 2023-02-09 | End: 2023-02-11

## 2023-02-09 RX ORDER — SIMETHICONE 80 MG/1
80 TABLET, CHEWABLE ORAL EVERY 4 HOURS
Refills: 0 | Status: DISCONTINUED | OUTPATIENT
Start: 2023-02-09 | End: 2023-02-11

## 2023-02-09 RX ORDER — BENZOCAINE 10 %
1 GEL (GRAM) MUCOUS MEMBRANE EVERY 6 HOURS
Refills: 0 | Status: DISCONTINUED | OUTPATIENT
Start: 2023-02-09 | End: 2023-02-11

## 2023-02-09 RX ORDER — SODIUM CHLORIDE 9 MG/ML
1000 INJECTION, SOLUTION INTRAVENOUS ONCE
Refills: 0 | Status: COMPLETED | OUTPATIENT
Start: 2023-02-09 | End: 2023-02-09

## 2023-02-09 RX ORDER — OXYTOCIN 10 UNIT/ML
333.33 VIAL (ML) INJECTION
Qty: 20 | Refills: 0 | Status: DISCONTINUED | OUTPATIENT
Start: 2023-02-09 | End: 2023-02-10

## 2023-02-09 RX ORDER — LANOLIN
1 OINTMENT (GRAM) TOPICAL EVERY 6 HOURS
Refills: 0 | Status: DISCONTINUED | OUTPATIENT
Start: 2023-02-09 | End: 2023-02-11

## 2023-02-09 RX ORDER — PRAMOXINE HYDROCHLORIDE 150 MG/15G
1 AEROSOL, FOAM RECTAL EVERY 4 HOURS
Refills: 0 | Status: DISCONTINUED | OUTPATIENT
Start: 2023-02-09 | End: 2023-02-11

## 2023-02-09 RX ORDER — IBUPROFEN 200 MG
600 TABLET ORAL EVERY 6 HOURS
Refills: 0 | Status: COMPLETED | OUTPATIENT
Start: 2023-02-09 | End: 2024-01-08

## 2023-02-09 RX ADMIN — Medication 200 GRAM(S): at 18:54

## 2023-02-09 RX ADMIN — SODIUM CHLORIDE 1000 MILLILITER(S): 9 INJECTION, SOLUTION INTRAVENOUS at 22:00

## 2023-02-09 NOTE — OB PROVIDER TRIAGE NOTE - NSHPPHYSICALEXAM_GEN_ALL_CORE
ICU Vital Signs Last 24 Hrs  T(C): 36.9 (09 Feb 2023 17:32), Max: 36.9 (09 Feb 2023 17:32)  T(F): 98.4 (09 Feb 2023 17:32), Max: 98.4 (09 Feb 2023 17:32)  HR: 83 (09 Feb 2023 18:28) (83 - 97)  BP: 120/66 (09 Feb 2023 18:28) (120/66 - 128/73)  BP(mean): --  ABP: --  ABP(mean): --  RR: 16 (09 Feb 2023 17:32) (16 - 16)  SpO2: --    Gen: A&O x 3; uncomfortable with ctx's    Pulm- CTA B/L; no wheezes, breathing unlabored  Cor- clear S1S2  Abd exam- soft and nontender    VE-4/80/-1; pt grossly ruptured with clear fluid  NST cat I with 145 baseline with accels and mod variability; irreg ctx's  Limited BPP to confirm vtx fetal presentation and posterior placenta  EFW~3118  GBS unknown

## 2023-02-09 NOTE — OB PROVIDER TRIAGE NOTE - NSICDXPASTMEDICALHX_GEN_ALL_CORE_FT
PAST MEDICAL HISTORY:  Abnormal cervical Papanicolaou smear, unspecified abnormal pap finding Diagnosed at 8 weeks pregnancy, for PP follow up    Elevated liver enzymes Chronic     labor Admitted at [facility] 19-19 for PTL, BAM

## 2023-02-09 NOTE — OB RN DELIVERY SUMMARY - NS_SEPSISRSKCALC_OBGYN_ALL_OB_FT
GBS status in the 'Prenatal Lab tests/results section' on the OB RN Patient Profile must be documented.   EOS calculated successfully. EOS Risk Factor: 0.5/1000 live births ([facility] national incidence); GA=36w2d; Temp=98.4; ROM=6.083; GBS='Unknown'; Antibiotics='GBS specific antibiotics > 2 hrs prior to birth'

## 2023-02-09 NOTE — OB PROVIDER TRIAGE NOTE - HISTORY OF PRESENT ILLNESS
33yo female  @ 36.2 wks SLIUP uncomp PNC here complaining of leaking of clear fluid that started at 1530 with lower abdominal cramping. Pt reports GFM. Pt was in the office yesterday where she had her GBS cx collected.      Pmhx-chronic elevated LFT's  Pshx/Hosp- denies  Meds- PNV  NKDA  Past ob-3/20/2020-8#7  FT complicated by PTL and received MgSO4 and betamethasone  Gyn- abnl PAP smears-> colpo with cervical bx   Soc- denies

## 2023-02-09 NOTE — OB PROVIDER H&P - NSICDXPASTMEDICALHX_GEN_ALL_CORE_FT
PAST MEDICAL HISTORY:  Abnormal cervical Papanicolaou smear, unspecified abnormal pap finding Diagnosed at 8 weeks pregnancy, for PP follow up    Elevated liver enzymes Chronic     labor Admitted at [facility] 19-19 for PTL, BAM

## 2023-02-09 NOTE — OB RN DELIVERY SUMMARY - NSSELHIDDEN_OBGYN_ALL_OB_FT
[NS_DeliveryAttending1_OBGYN_ALL_OB_FT:[id number]],[NS_DeliveryRN_OBGYN_ALL_OB_FT:[id number]]

## 2023-02-09 NOTE — OB RN PATIENT PROFILE - PRETERM DELIVERIES, OB PROFILE
[facility] - Pediatric Acute Care  Pediatric Hospital Medicine  Progress Note    Patient Name: Karis Reynolds  MRN: [id number]  Admission Date: 12/13/2022  Hospital Length of Stay: 5  Code Status: Full Code   Primary Care Physician: [facility]  Principal Problem: Failure to thrive in infant    Subjective:     HPI:  Karis is an 11moF without a significant past medical history who presented to OSH ED for cough and hypoxia to 76-78% on RA, RSV positive, transferred to PICU for continued support. Mom reports 10 days of fever, cough, nausea, vomiting and diarrhea. ED found patient to be initially hypoxic with good response to supplemental oxygen. Mom had tried giving Karis OTC amoxicillin and tylenol, she also gave one dose of amoxicillin today.  Mom reports that they arrived from [place] 3 days ago and there was difficulty in immigration giving the babies medications. Mother denies apnea, but does report one episode of perioral cyanosis at home prior to coming to ED today. Patient has not been eating recently while ill, exclusively breast feeding and vomiting after some feeds (x3 today). Reduced UOP, four wet diapers today. Mom reports loose green stool diapers with increased frequency from normal. Also had red eyes with yellow discharge recently that resolved with eye drops     Mom reports that the patient is full term, did not require any NICU stay or oxygen support after delivery, no complications during pregnancy. The patient has been treated about one month ago with amoxicillin for cough, unclear if there was also some degree of respiratory distress with that episode. Mom reports UTD with vaccine, with two being given when passing through immigration.      Hospital Course:  On arrival, patient had , RR 66, temp 104.4, sats 76%. Additional labs and imaging were performed as listed below. The patient was given one  20cc/kg NS bolus, followed by NS infusion. Ibuprofen was given,  followed by tylenol. Sats improved with supplementation up to 9L. Patient was weaned to room air; however, she was noticed to have moderate malnutrition. Nutrition was consulted-- recs were as followed:  EBM 4 oz q3h to provide 640 kcal (89 kcal/kg) to meet 100% EEN; if fortification rec's warranted, recommend EBM fortified with Enfamil Infant to 28 kcal/oz 4 oz q4h 672 kcal (93 kcal/kg) to meet 100% EEN. Goal PO intake of EBM to 32oz in 24hrs- infant taking in only 12oz 12/17- milk changed to fortified to 28kcal- 4oz every 4 hours. Mom described sensation of possible milk duct obstruction- I encouraged regular pumping and changed infant to 28kcal formula during the day with breast milk at night time.       Scheduled Meds:   k phos di & mono-sod phos mono  1 tablet Oral Daily    nystatin  2 mL Oral QID     Continuous Infusions:  PRN Meds:albuterol sulfate, ibuprofen    Interval History: Patient was seen and evaluated-- 11oz taken in in the past 24hrs. Per mother, she is now developing BL breast pain with feeding. She does not desire to pump due to the pain- mom denied fevers or chills for herself. She is not supplementing formula for Karis. Discussed with mother the need to supplement formula- 28kcal 4oz every 4 hours during the day with breast feeding at bedtime- will re-touch base with nutrition in the AM. Mom in agreement. Karis has not shown weight gain since 12/13- 3 weights total- 12/13-->7.2kg; 12/16-->7.195kg; 12/18-->7.03kg.    - Maurilio #[id number] utilized.     Scheduled Meds:   famotidine  0.5 mg/kg (Dosing Weight) Oral BID    k phos di & mono-sod phos mono  1 tablet Oral Daily    nystatin  2 mL Oral QID    prednisoLONE  2 mg/kg/day (Dosing Weight) Oral BID     Continuous Infusions:  PRN Meds:albuterol sulfate, ibuprofen    Review of Systems   Constitutional:  Negative for activity change, appetite change, fever and irritability.   HENT:  Positive for rhinorrhea and sneezing.    Respiratory:   Positive for cough. Negative for wheezing.    Cardiovascular:  Negative for fatigue with feeds, sweating with feeds and cyanosis.   Gastrointestinal:  Negative for constipation, diarrhea and vomiting.   Genitourinary:  Negative for decreased urine volume.   Skin:  Negative for rash.   Objective:     Vital Signs (Most Recent):  Temp: 97.1 °F (36.2 °C) (12/18/22 1247)  Pulse: 109 (12/18/22 1431)  Resp: (!) 44 (12/18/22 1247)  BP: (!) 101/59 (12/18/22 1247)  SpO2: (!) 92 % (12/18/22 1431)   Vital Signs (24h Range):  Temp:  [96.6 °F (35.9 °C)-98.2 °F (36.8 °C)] 97.1 °F (36.2 °C)  Pulse:  [] 109  Resp:  [32-44] 44  SpO2:  [92 %-100 %] 92 %  BP: ()/(53-75) 101/59     Patient Vitals for the past 72 hrs (Last 3 readings):   Weight   12/18/22 0316 7.03 kg (15 lb 8 oz)   12/16/22 1957 7.195 kg (15 lb 13.8 oz)       Body mass index is 12.84 kg/m².    Intake/Output - Last 3 Shifts         12/16 0700  12/17 0659 12/17 0700  12/18 0659 12/18 0700  12/19 0659    P.O. 360 330     Total Intake(mL/kg) 360 (50) 330 (46.9)     Urine (mL/kg/hr) 445 (2.6) 256 (1.5) 62 (1.1)    Other 283      Stool 0 0     Total Output 728 256 62    Net -368 +74 -62           Stool Occurrence 1 x 1 x     Emesis Occurrence 0 x            Gen: Patient is awake in crib with mother at bedside. NAD  HEENT: Neck supple.  Oral mucosa moist.  White patch of back of tongue  CV: RRR, no MRG, S1 and S2 appreciated  Lungs: CTA BL, no w/r/r, no accessory muscle use. Currently on room air  Abd: soft, NTND, + BS, no organomegaly  : nml female- go stage 1- some mild erythema of left labia majora  MSK: moves all ext well, no cyanosis/clubbing/edema   Skin: warm, moist, two macules of right cheek- circular macules of RUE- (per mother, had pustules previously that have sense improved)    Significant Labs:  No results for input(s): POCTGLUCOSE in the last 48 hours.    Inflammatory Markers: No results for input(s): SEDRATE, CRP, PROCAL in the last 48  hours.  POCT Glucose: No results for input(s): POCTGLUCOSE in the last 24 hours.    Significant Imaging: I have reviewed all pertinent imaging results/findings within the past 24 hours.    Assessment/Plan:     Pulmonary  Respiratory distress  - see RSV    RSV (acute bronchiolitis due to respiratory syncytial virus)  - On RA, stable  - s/p 5d of steroids   - Albuterol nebs and nebulizer solution prescribed for home use--order placed and should be at bedside    ID  Oral candidiasis  - continue nystatin 200,000 QID - d2/7  - monitor for improvement     Other  * Failure to thrive in infant  - Z-score of -2.53 indicating moderate malnutrition.  - Weights: 12/13-->7.2kg; 12/16-->7.195kg; 12/18-->7.03kg  - Discussed with mom need to strictly supplement with formula during the day- 28kcal- 4oz every 4 hours; ok to breastfeed at night- if no weight gain in the next 24hrs, then will consider placing NGT for further feeding and weight gain- to further evaluate if weight loss is 2/2 intake vs. Other etiologies needing to be further pursued.   - Abnormal BMP on admit with low sodium   - Mg wnl, mildly low phos- started replacement- Daily BMP, Mg, Phos to monitor for refeeding -Daily weights, nutrition consult, strict I's and O's          Follow-up Information     [facility] Follow up on 12/19/2022.    Why: 9:15am appointment - hospital follow up to establish pediatrician - has [place] speaking staff  Contact information:  [address] [address]  [phone]             [facility] Follow up.    Contact information:  Referral made to [facility] (faxed 12/16). [facility] will call mom and come to home to do developmental evaluation and provide services if child has delays identified on evaluation. They will call next week on Mon, Tuesday, or Wednesday.                       Anticipated Disposition: Home or Self Care    Divya Hernandez MD  Pediatric Hospital Medicine   [facility] -  Pediatric Acute Care   0

## 2023-02-09 NOTE — OB RN PATIENT PROFILE - NSICDXPASTMEDICALHX_GEN_ALL_CORE_FT
PAST MEDICAL HISTORY:  Abnormal cervical Papanicolaou smear, unspecified abnormal pap finding Diagnosed at 8 weeks pregnancy, for PP follow up    Elevated liver enzymes Chronic     labor Admitted at [facility] 19-19 for PTL, BAM

## 2023-02-09 NOTE — OB PROVIDER DELIVERY SUMMARY - NSSELHIDDEN_OBGYN_ALL_OB_FT
[NS_DeliveryAttending1_OBGYN_ALL_OB_FT:[id number]],[NS_DeliveryRN_OBGYN_ALL_OB_FT:[id number]=]

## 2023-02-09 NOTE — OB RN TRIAGE NOTE - FALL HARM RISK - UNIVERSAL INTERVENTIONS
Bed in lowest position, wheels locked, appropriate side rails in place/Call bell, personal items and telephone in reach/Instruct patient to call for assistance before getting out of bed or chair/Non-slip footwear when patient is out of bed/[place] to call system/Physically safe environment - no spills, clutter or unnecessary equipment/Purposeful Proactive Rounding/Room/bathroom lighting operational, light cord in reach

## 2023-02-09 NOTE — OB PROVIDER H&P - ASSESSMENT
33yo female  @ 36.2 wks SLIUP uncomp PNC here with confirmed ROM in labor  -GBS unknown with  fetus->ampicillin ordered  -pt is   -pt was admitted for PPROM and PTL  -pt was dw Dr Sandoval  -pt was approved for epidural; pt is unsure if she wants it yet  -pt was swabbed for Covid-19

## 2023-02-09 NOTE — OB RN PATIENT PROFILE - FALL HARM RISK - UNIVERSAL INTERVENTIONS
Bed in lowest position, wheels locked, appropriate side rails in place/Call bell, personal items and telephone in reach/Instruct patient to call for assistance before getting out of bed or chair/Non-slip footwear when patient is out of bed/[place] to call system/Physically safe environment - no spills, clutter or unnecessary equipment/Purposeful Proactive Rounding/Room/bathroom lighting operational, light cord in reach

## 2023-02-09 NOTE — OB PROVIDER DELIVERY SUMMARY - NSPROVIDERDELIVERYNOTE_OBGYN_ALL_OB_FT
Head delivered in SAMEERA. Nuchal x1. Anterior shoulder was delivered followed by the posterior shoulder and remainder of the body. Nuchal cord reduced after delivery of the body    Infant passed to the mother. Cord clamping was delayed for 1 minute. Cord clamped and cut. Pediatrics at bedside given . Cord gasses obtained via a segment of cord.     Oxytocin given. Placenta was delivered spontaneously intact. Uterine massage was performed. Fundus firm.     Second degree laceration repaired via:  - Running 2-0 chromic for perineal muscles  - Interrupted 3-0 chromic for skin     Adequate hemostasis. QBL 135cc  Count correct x2

## 2023-02-09 NOTE — OB RN PATIENT PROFILE - WEIGHT: PREPREGNANCY IN LBS
PN,     Controlled Substance Refill Request for Ambien    Last refill: not delegated to you anymore, unable to review    Last clinic visit: 6/27/2019 with Dr Marx, due for physical with you Jan 2020     Clinic visit frequency required: not documented  Next appt: none    Controlled substance agreement on file: No.    Documentation in problem list reviewed:  No - insomnia not on problem list    Processing:  Rx to be electronically transmitted to pharmacy by provider     [place]: [place]  [place]:  [email or web address]    Please authorize if appropriate.  Thanks,  Ana QUIROGA RN       128

## 2023-02-09 NOTE — OB RN TRIAGE NOTE - NSICDXPASTMEDICALHX_GEN_ALL_CORE_FT
PAST MEDICAL HISTORY:  Abnormal cervical Papanicolaou smear, unspecified abnormal pap finding Diagnosed at 8 weeks pregnancy, for PP follow up    Elevated liver enzymes Chronic     labor Admitted at [facility] 19-19 for PTL, BAM

## 2023-02-09 NOTE — OB PROVIDER H&P - NSLOWPPHRISK_OBGYN_A_OB
No previous uterine incision/[place] Pregnancy/Less than or equal to 4 previous vaginal births/No known bleeding disorder/No history of postpartum hemorrhage/No other PPH risks indicated

## 2023-02-10 LAB
COVID-19 SPIKE DOMAIN AB INTERP: POSITIVE
COVID-19 SPIKE DOMAIN ANTIBODY RESULT: >250 U/ML — HIGH
HBV SURFACE AG SERPL QL IA: SIGNIFICANT CHANGE UP
RUBV IGG SER-ACNC: 0.8 INDEX — SIGNIFICANT CHANGE UP
RUBV IGG SER-IMP: NEGATIVE — SIGNIFICANT CHANGE UP
SARS-COV-2 IGG+IGM SERPL QL IA: >250 U/ML — HIGH
SARS-COV-2 IGG+IGM SERPL QL IA: POSITIVE
SARS-COV-2 RNA SPEC QL NAA+PROBE: SIGNIFICANT CHANGE UP
T PALLIDUM AB TITR SER: NEGATIVE — SIGNIFICANT CHANGE UP

## 2023-02-10 RX ORDER — IBUPROFEN 200 MG
1 TABLET ORAL
Qty: 0 | Refills: 0 | DISCHARGE
Start: 2023-02-10

## 2023-02-10 RX ORDER — IBUPROFEN 200 MG
600 TABLET ORAL EVERY 6 HOURS
Refills: 0 | Status: DISCONTINUED | OUTPATIENT
Start: 2023-02-10 | End: 2023-02-11

## 2023-02-10 RX ADMIN — SODIUM CHLORIDE 3 MILLILITER(S): 9 INJECTION INTRAMUSCULAR; INTRAVENOUS; SUBCUTANEOUS at 06:35

## 2023-02-10 RX ADMIN — SODIUM CHLORIDE 3 MILLILITER(S): 9 INJECTION INTRAMUSCULAR; INTRAVENOUS; SUBCUTANEOUS at 20:41

## 2023-02-10 RX ADMIN — SODIUM CHLORIDE 3 MILLILITER(S): 9 INJECTION INTRAMUSCULAR; INTRAVENOUS; SUBCUTANEOUS at 00:01

## 2023-02-10 RX ADMIN — Medication 1 TABLET(S): at 12:31

## 2023-02-10 RX ADMIN — SODIUM CHLORIDE 3 MILLILITER(S): 9 INJECTION INTRAMUSCULAR; INTRAVENOUS; SUBCUTANEOUS at 14:00

## 2023-02-10 NOTE — DISCHARGE NOTE OB - NS MD DC FALL RISK RISK
For information on Fall & Injury Prevention, visit: [email or web address] OR  [email or web address] OR  https://www.cdc.gov/steadi/patient.html

## 2023-02-10 NOTE — PROGRESS NOTE ADULT - SUBJECTIVE AND OBJECTIVE BOX
S: Patient doing well. Minimal lochia. Pain controlled.    O: Vital Signs Last 24 Hrs  T(C): 36.7 (10 Feb 2023 06:00), Max: 36.9 (2023 17:32)  T(F): 98.1 (10 Feb 2023 06:00), Max: 98.4 (2023 17:32)  HR: 72 (10 Feb 2023 06:00) (64 - 100)  BP: 118/61 (10 Feb 2023 06:00) (87/52 - 150/85)  BP(mean): --  RR: 18 (10 Feb 2023 06:00) (14 - 18)  SpO2: 100% (10 Feb 2023 06:00) (94% - 100%)    Parameters below as of 10 Feb 2023 06:00  Patient On (Oxygen Delivery Method): room air        Gen: NAD  Abd: soft, NT, ND, fundus firm below umbilicus  Lochia: moderate  Ext: no tenderness    Labs:                        12.6   11.39 )-----------( 288      ( 2023 19:25 )             39.1       A: 32y PPD#1 s/p  doing well.  Plan: routine care  Dc with instructions

## 2023-02-10 NOTE — DISCHARGE NOTE OB - PATIENT PORTAL LINK FT
You can access the FollowMyHealth Patient Portal offered by [facility] by registering at the following website: [email or web address]. By joining [employer]’s FollowMyHealth portal, you will also be able to view your health information using other applications (apps) compatible with our system.

## 2023-02-10 NOTE — DISCHARGE NOTE OB - MEDICATION SUMMARY - MEDICATIONS TO TAKE
I will START or STAY ON the medications listed below when I get home from the hospital:    ibuprofen 600 mg oral tablet  -- 1 tab(s) by mouth every 6 hours  -- Indication: For Other pregnancy-related conditions, antepartum    PNV Prenatal oral tablet  -- orally once a day  -- Indication: For  labor in third trimester

## 2023-02-10 NOTE — DISCHARGE NOTE OB - MATERIALS PROVIDED
[facility] [place] Screening Program/[place]  Immunization Record/Guide to Postpartum Care/Shaken Baby Prevention Handout/Discharge Medication Information for Patients and Families Pocket Guide

## 2023-02-10 NOTE — DISCHARGE NOTE OB - CARE PROVIDER_API CALL
Henry Sandoval)  Obstetrics and Gynecology  [address] [place] [address]  Phone: [phone]  Fax: [phone]  Follow Up Time: 1 month

## 2023-02-11 VITALS
DIASTOLIC BLOOD PRESSURE: 64 MMHG | OXYGEN SATURATION: 100 % | HEART RATE: 84 BPM | SYSTOLIC BLOOD PRESSURE: 118 MMHG | RESPIRATION RATE: 17 BRPM | TEMPERATURE: 98 F

## 2023-02-11 RX ADMIN — Medication 0.5 MILLILITER(S): at 14:17

## 2023-03-11 LAB — SURGICAL PATHOLOGY STUDY: SIGNIFICANT CHANGE UP

## 2023-12-13 NOTE — OB RN PATIENT PROFILE - BREASTFEEDING PROVIDES STABLE TEMPERATURE THROUGH SKIN TO SKIN CONTACT
Number Of Freeze-Thaw Cycles: 1 freeze-thaw cycle Duration Of Freeze Thaw-Cycle (Seconds): 3 Application Tool (Optional): Liquid Nitrogen Sprayer Post-Care Instructions: I reviewed with the patient in detail post-care instructions. Patient is to wear sunprotection, and avoid picking at any of the treated lesions. Pt may apply Vaseline to crusted or scabbing areas. Render Note In Bullet Format When Appropriate: No Show Aperture Variable?: Yes Consent: The patient's consent was obtained including but not limited to risks of crusting, scabbing, blistering, scarring, darker or lighter pigmentary change, recurrence, incomplete removal and infection. Detail Level: Detailed Detail Level: Zone Duration Of Freeze Thaw-Cycle (Seconds): 5-10 Medical Necessity Clause: This procedure was medically necessary because the lesions that were treated were: Spray Paint Text: The liquid nitrogen was applied to the skin utilizing a spray paint frosting technique. Medical Necessity Information: It is in your best interest to select a reason for this procedure from the list below. All of these items fulfill various CMS LCD requirements except the new and changing color options. Statement Selected

## 2025-01-17 NOTE — OB RN PATIENT PROFILE - NS PRO PT RIGHT SUPPORT PERSON
Pt is a 30y.o.  @ 38w5d dated by 1st trimester sonogram presents c/o multiple episodes of vomiting since yesterday AM, last episode at 0900, unable to tolerate fluids or food. Pt also has c/o diarrhea, last episode 1100. Pts children were diagnosed with Norovirus earlier this week. Pt c/o lower abdominal cramping, coming and going. Pt was started on Amoxicillin 2 days ago for UTI which she has not been able to tolerate. Pt reports burning on urination and frequency. Pt denies LOF, VB and reports good FM     H/o  x 2, FT then  x 1 for suspected macrosomia. Pt scheduled for repeat  
Declines

## 2025-02-14 ENCOUNTER — NON-APPOINTMENT (OUTPATIENT)
Age: 35
End: 2025-02-14

## 2025-02-14 DIAGNOSIS — Z83.3 FAMILY HISTORY OF DIABETES MELLITUS: ICD-10-CM

## 2025-02-14 DIAGNOSIS — Z98.890 OTHER SPECIFIED POSTPROCEDURAL STATES: ICD-10-CM

## 2025-02-14 DIAGNOSIS — Z82.49 FAMILY HISTORY OF ISCHEMIC HEART DISEASE AND OTHER DISEASES OF THE CIRCULATORY SYSTEM: ICD-10-CM

## 2025-02-17 ENCOUNTER — APPOINTMENT (OUTPATIENT)
Facility: CLINIC | Age: 35
End: 2025-02-17
Payer: COMMERCIAL

## 2025-02-17 VITALS — SYSTOLIC BLOOD PRESSURE: 102 MMHG | DIASTOLIC BLOOD PRESSURE: 66 MMHG

## 2025-02-17 DIAGNOSIS — N81.6 RECTOCELE: ICD-10-CM

## 2025-02-17 DIAGNOSIS — N94.10 UNSPECIFIED DYSPAREUNIA: ICD-10-CM

## 2025-02-17 LAB — HCG UR QL: NEGATIVE

## 2025-02-17 PROCEDURE — 99459 PELVIC EXAMINATION: CPT

## 2025-02-17 PROCEDURE — 99214 OFFICE O/P EST MOD 30 MIN: CPT

## 2025-02-17 PROCEDURE — 81025 URINE PREGNANCY TEST: CPT

## 2025-04-08 RX ORDER — TERCONAZOLE 8 MG/G
0.8 CREAM VAGINAL
Qty: 1 | Refills: 0 | Status: ACTIVE | COMMUNITY
Start: 2025-04-08 | End: 1900-01-01

## 2025-08-11 ENCOUNTER — APPOINTMENT (OUTPATIENT)
Facility: CLINIC | Age: 35
End: 2025-08-11

## 2025-08-18 ENCOUNTER — APPOINTMENT (OUTPATIENT)
Facility: CLINIC | Age: 35
End: 2025-08-18

## 2025-08-20 ENCOUNTER — APPOINTMENT (OUTPATIENT)
Facility: CLINIC | Age: 35
End: 2025-08-20
Payer: COMMERCIAL

## 2025-08-20 VITALS — SYSTOLIC BLOOD PRESSURE: 96 MMHG | DIASTOLIC BLOOD PRESSURE: 67 MMHG

## 2025-08-20 LAB — HCG UR QL: NEGATIVE

## 2025-08-20 PROCEDURE — G0444 DEPRESSION SCREEN ANNUAL: CPT | Mod: 59

## 2025-08-20 PROCEDURE — 99395 PREV VISIT EST AGE 18-39: CPT

## 2025-08-20 PROCEDURE — 99459 PELVIC EXAMINATION: CPT | Mod: NC

## 2025-08-20 PROCEDURE — 81025 URINE PREGNANCY TEST: CPT

## 2025-08-22 LAB
C TRACH RRNA SPEC QL NAA+PROBE: NOT DETECTED
HPV HIGH+LOW RISK DNA PNL CVX: NOT DETECTED
N GONORRHOEA RRNA SPEC QL NAA+PROBE: NOT DETECTED
SOURCE AMPLIFICATION: NORMAL

## 2025-08-25 LAB — CYTOLOGY CVX/VAG DOC THIN PREP: NORMAL
